# Patient Record
Sex: MALE | Race: WHITE | NOT HISPANIC OR LATINO | Employment: FULL TIME | URBAN - METROPOLITAN AREA
[De-identification: names, ages, dates, MRNs, and addresses within clinical notes are randomized per-mention and may not be internally consistent; named-entity substitution may affect disease eponyms.]

---

## 2018-05-22 ENCOUNTER — OFFICE VISIT (OUTPATIENT)
Dept: GASTROENTEROLOGY | Facility: CLINIC | Age: 18
End: 2018-05-22

## 2018-05-22 VITALS
BODY MASS INDEX: 25.45 KG/M2 | WEIGHT: 181.8 LBS | SYSTOLIC BLOOD PRESSURE: 126 MMHG | TEMPERATURE: 99.1 F | DIASTOLIC BLOOD PRESSURE: 82 MMHG | HEIGHT: 71 IN

## 2018-05-22 DIAGNOSIS — K62.5 RECTAL BLEEDING: Primary | ICD-10-CM

## 2018-05-22 PROCEDURE — 99203 OFFICE O/P NEW LOW 30 MIN: CPT | Performed by: INTERNAL MEDICINE

## 2018-05-22 RX ORDER — SUMATRIPTAN 50 MG/1
50 TABLET, FILM COATED ORAL
COMMUNITY
Start: 2018-03-19 | End: 2019-03-19

## 2018-05-22 RX ORDER — SODIUM CHLORIDE, SODIUM LACTATE, POTASSIUM CHLORIDE, CALCIUM CHLORIDE 600; 310; 30; 20 MG/100ML; MG/100ML; MG/100ML; MG/100ML
30 INJECTION, SOLUTION INTRAVENOUS CONTINUOUS
Status: CANCELLED | OUTPATIENT
Start: 2018-05-29

## 2018-05-22 RX ORDER — TOPIRAMATE 100 MG/1
100 CAPSULE, EXTENDED RELEASE ORAL DAILY
COMMUNITY
Start: 2018-05-22

## 2018-05-22 NOTE — PROGRESS NOTES
Chief Complaint   Patient presents with   • Rectal Bleeding     Subjective      HPI     Milton Islas is a 18 y.o. male who presents for evaluation of rectal bleeding.  Episodes occurring off/on since 2016.  Predominately fresh blood, mostly on tissue with wiping.  No associated abdominal pain.  No change in bowel habit.  No fevers/chills.  No family hx of GI related disorders.  Last episode was 2 weeks ago.        Past Medical History:   Diagnosis Date   • ADHD (attention deficit hyperactivity disorder)    • Hearing loss        Current Outpatient Prescriptions:   •  Methylphenidate 17.3 MG Tablet Extended Release Dispersible, Take  by mouth., Disp: , Rfl:   •  SUMAtriptan (IMITREX) 50 MG tablet, Take 50 mg by mouth., Disp: , Rfl:   •  Topiramate ER (TROKENDI XR) 100 MG capsule sustained-release 24 hr, Take 100 mg by mouth., Disp: , Rfl:      Allergies   Allergen Reactions   • Penicillins Rash     Social History     Social History   • Marital status: Single     Spouse name: N/A   • Number of children: N/A   • Years of education: N/A     Occupational History   • Not on file.     Social History Main Topics   • Smoking status: Never Smoker   • Smokeless tobacco: Not on file   • Alcohol use No   • Drug use: Unknown   • Sexual activity: Not on file     Other Topics Concern   • Not on file     Social History Narrative   • No narrative on file     History reviewed. No pertinent family history.     Review of Systems   Constitutional: Negative.    Respiratory: Negative.    Cardiovascular: Negative.    Gastrointestinal: Positive for blood in stool.   Psychiatric/Behavioral: Negative.           Objective   Vitals:    05/22/18 1456   BP: 126/82   Temp: 99.1 °F (37.3 °C)     Physical Exam   Constitutional: He is oriented to person, place, and time. He appears well-developed and well-nourished.   HENT:   Head: Normocephalic and atraumatic.   Abdominal: Soft. Bowel sounds are normal. He exhibits no distension and no mass. There is  no tenderness. No hernia.   Genitourinary: Rectal exam shows no external hemorrhoid, no internal hemorrhoid, no fissure and no tenderness.   Neurological: He is alert and oriented to person, place, and time.   Skin: Skin is warm and dry.   Psychiatric: He has a normal mood and affect. His behavior is normal. Judgment and thought content normal.   Vitals reviewed.       Assessment/Plan   Assessment:     1. Rectal bleeding      Plan:   Suspect internal hemorrhoids as most likely culprit, but recommend we proceed with colonoscopy to ensure no IBD or other underlying pathology.  Risks/beneifts discussed with pt and he is agreeable to proceeding        Caesar Marion M.D.  [facility]  [address] [place] [address]  Office: [phone]

## 2018-05-29 ENCOUNTER — ANESTHESIA (OUTPATIENT)
Dept: GASTROENTEROLOGY | Facility: HOSPITAL | Age: 18
End: 2018-05-29

## 2018-05-29 ENCOUNTER — HOSPITAL ENCOUNTER (OUTPATIENT)
Facility: HOSPITAL | Age: 18
Setting detail: HOSPITAL OUTPATIENT SURGERY
Discharge: HOME | End: 2018-05-29
Attending: INTERNAL MEDICINE | Admitting: INTERNAL MEDICINE

## 2018-05-29 ENCOUNTER — ANESTHESIA EVENT (OUTPATIENT)
Dept: GASTROENTEROLOGY | Facility: HOSPITAL | Age: 18
End: 2018-05-29

## 2018-05-29 VITALS
RESPIRATION RATE: 18 BRPM | OXYGEN SATURATION: 100 % | BODY MASS INDEX: 25.02 KG/M2 | DIASTOLIC BLOOD PRESSURE: 75 MMHG | WEIGHT: 178.7 LBS | HEART RATE: 65 BPM | HEIGHT: 71 IN | SYSTOLIC BLOOD PRESSURE: 101 MMHG | TEMPERATURE: 99.1 F

## 2018-05-29 DIAGNOSIS — K62.5 RECTAL BLEEDING: ICD-10-CM

## 2018-05-29 PROCEDURE — 45378 DIAGNOSTIC COLONOSCOPY: CPT | Performed by: INTERNAL MEDICINE

## 2018-05-29 PROCEDURE — 25010000002 PROPOFOL 10 MG/ML EMULSION: Performed by: ANESTHESIOLOGY

## 2018-05-29 RX ORDER — PROPOFOL 10 MG/ML
VIAL (ML) INTRAVENOUS AS NEEDED
Status: DISCONTINUED | OUTPATIENT
Start: 2018-05-29 | End: 2018-05-29 | Stop reason: SURG

## 2018-05-29 RX ORDER — SODIUM CHLORIDE, SODIUM LACTATE, POTASSIUM CHLORIDE, CALCIUM CHLORIDE 600; 310; 30; 20 MG/100ML; MG/100ML; MG/100ML; MG/100ML
30 INJECTION, SOLUTION INTRAVENOUS CONTINUOUS
Status: DISCONTINUED | OUTPATIENT
Start: 2018-05-29 | End: 2018-05-29 | Stop reason: HOSPADM

## 2018-05-29 RX ORDER — LIDOCAINE HYDROCHLORIDE 20 MG/ML
INJECTION, SOLUTION INFILTRATION; PERINEURAL AS NEEDED
Status: DISCONTINUED | OUTPATIENT
Start: 2018-05-29 | End: 2018-05-29 | Stop reason: SURG

## 2018-05-29 RX ORDER — PROPOFOL 10 MG/ML
VIAL (ML) INTRAVENOUS CONTINUOUS PRN
Status: DISCONTINUED | OUTPATIENT
Start: 2018-05-29 | End: 2018-05-29 | Stop reason: SURG

## 2018-05-29 RX ADMIN — SODIUM CHLORIDE, POTASSIUM CHLORIDE, SODIUM LACTATE AND CALCIUM CHLORIDE 30 ML/HR: 600; 310; 30; 20 INJECTION, SOLUTION INTRAVENOUS at 09:09

## 2018-05-29 RX ADMIN — PROPOFOL 50 MG: 10 INJECTION, EMULSION INTRAVENOUS at 09:57

## 2018-05-29 RX ADMIN — LIDOCAINE HYDROCHLORIDE 60 MG: 20 INJECTION, SOLUTION INFILTRATION; PERINEURAL at 09:53

## 2018-05-29 RX ADMIN — PROPOFOL 160 MCG/KG/MIN: 10 INJECTION, EMULSION INTRAVENOUS at 09:53

## 2018-05-29 RX ADMIN — PROPOFOL 150 MG: 10 INJECTION, EMULSION INTRAVENOUS at 09:53

## 2018-05-29 NOTE — ANESTHESIA POSTPROCEDURE EVALUATION
"Patient: Milton Islas    Procedure Summary     Date:  05/29/18 Room / Location:   [facility] /  [facility]    Anesthesia Start:  0950 Anesthesia Stop:  1011    Procedure:  COLONOSCOPY TO CECUM AND TI (N/A ) Diagnosis:       Rectal bleeding      (Rectal bleeding [K62.5])    Surgeon:  Caesar Marion MD Provider:  Caesar Bradshaw MD    Anesthesia Type:  MAC ASA Status:  2          Anesthesia Type: MAC  Last vitals  BP   101/75 (05/29/18 1041)   Temp   37.3 °C (99.1 °F) (05/29/18 0904)   Pulse   65 (05/29/18 1041)   Resp   18 (05/29/18 1041)     SpO2   100 % (05/29/18 1041)     Post Anesthesia Care and Evaluation    Patient location during evaluation: bedside  Patient participation: complete - patient participated  Level of consciousness: awake and alert  Pain management: adequate  Airway patency: patent  Anesthetic complications: No anesthetic complications  PONV Status: none  Cardiovascular status: acceptable  Respiratory status: acceptable  Hydration status: acceptable    Comments: /75 (Patient Position: Sitting)   Pulse 65   Temp 37.3 °C (99.1 °F) (Oral)   Resp 18   Ht 180.3 cm (71\")   Wt 81.1 kg (178 lb 11.2 oz)   SpO2 100%   BMI 24.92 kg/m²         "

## 2018-05-29 NOTE — ANESTHESIA PREPROCEDURE EVALUATION
Anesthesia Evaluation     Patient summary reviewed   no history of anesthetic complications:  NPO Solid Status: > 8 hours  NPO Liquid Status: > 2 hours           Airway   Mallampati: I  TM distance: >3 FB  Neck ROM: full  no difficulty expected  Dental      Pulmonary     breath sounds clear to auscultation  (-) shortness of breath, not a smoker  Cardiovascular   Exercise tolerance: good (4-7 METS)    Rhythm: regular  Rate: normal    (-) angina, FLORES      Neuro/Psych  GI/Hepatic/Renal/Endo    (+)  GI bleeding,     Musculoskeletal     Abdominal    Substance History      OB/GYN          Other                        Anesthesia Plan    ASA 2     MAC     Anesthetic plan and risks discussed with patient.

## 2018-11-12 ENCOUNTER — TELEPHONE (OUTPATIENT)
Dept: GASTROENTEROLOGY | Facility: CLINIC | Age: 18
End: 2018-11-12

## 2018-11-12 NOTE — TELEPHONE ENCOUNTER
"Per a staff message from [place]: \"PT'S MOM PHILIP CALLED AGAIN - RECTAL BLEEDING. PROBLEM STARTED YESTERDAY. DR BECKMAN ADVISED THEM TO CALL IF HAPPENED AGAIN.\"    Called pt's mom back (ok per SHALOM). She states pt had a scope back in May and everything was normal. Over the weekend, pt was on his way back in to town and he kept telling the person who was driving that he had to go to the bathroom. They tried stopping a few times, but everywhere was closed. When they finally stopped, he had a large soft BM with bright red blood mixed in with stool. She states the blood was in a line \"like a worm\". Stool was normal in color: brown and there was about a teaspoonful of blood in the commode. Pt not having any other symptoms: no abdominal pain, nausea, vomiting fever or chills. Asked if pt has continued to have rectal bleeding since then and she stated she did not think so because pt has not mentioned another episode. Advised will update MD ad call back if he has any recs for pt. Pt's mom verb understanding.   "

## 2018-11-12 NOTE — TELEPHONE ENCOUNTER
----- Message from Nathalia Fisher sent at 11/12/2018 11:18 AM EST -----  Regarding: blood in stool   Contact: [phone]  Mother called stated having some bright red blood in stool

## 2018-11-13 NOTE — TELEPHONE ENCOUNTER
Called [phone] and pt's father answered the phone. He advised I can reach pt at his cell number, [phone]. Since he was not listed on SHALOM, called pt at cell number.     Called pt and left a message for call back.

## 2018-11-13 NOTE — TELEPHONE ENCOUNTER
Pt returned call. Advised of the note from Dr Marion. He verb understanding. He has not had any further episodes since the one after driving home. He will continue to mitior and call if he has any further issues.    normal (ped)...

## 2019-02-26 ENCOUNTER — TELEPHONE (OUTPATIENT)
Dept: GASTROENTEROLOGY | Facility: CLINIC | Age: 19
End: 2019-02-26

## 2019-02-26 NOTE — TELEPHONE ENCOUNTER
----- Message from Yanni Olmstead sent at 2/26/2019  1:42 PM EST -----  Regarding: RECTAL BLEEDING  Contact: [phone]  BRIGHT RED BLOOD. PT MOM PHILIP CALLED. PT WAS ADVISED TO CALL BACK WHENEVER HE HAD ISSUES.

## 2019-02-26 NOTE — TELEPHONE ENCOUNTER
Returned mother's phone call(adamaris is listed on SHALOM). She states the patient called her and told her he has passed bright red blood for the past two Sundays and requested she call the office to set up an appointment. When asked for more details, mother was unable. Advised will need to talk to the patient. She states the best way to contact the patient is by his cell phone number. [phone].

## 2019-02-27 NOTE — TELEPHONE ENCOUNTER
Pt returned call per a staff message.    Called pt back.... No answer; left a message for call back.

## 2019-02-28 NOTE — TELEPHONE ENCOUNTER
Patient called into the office, he states 2 weeks ago(week of 2/10) he had a BM on Usama 2/10 and then again on Saturday 2/16.  He states the blood was more like clots and were anywhere between bright red and burgundy in color. Denies any abdominal cramping and state no change in diet. Scheduled patient with Chloe VÁSQUEZ on 3/6. Advised Dr. Marion will be in the office should any questions should arise that the NP cannot answer. Advised will send an update to Dr. Marion. He verb understanding and is in agreement with the plan.

## 2019-03-06 ENCOUNTER — OFFICE VISIT (OUTPATIENT)
Dept: GASTROENTEROLOGY | Facility: CLINIC | Age: 19
End: 2019-03-06

## 2019-03-06 VITALS
DIASTOLIC BLOOD PRESSURE: 80 MMHG | TEMPERATURE: 98.8 F | WEIGHT: 172.8 LBS | BODY MASS INDEX: 24.19 KG/M2 | SYSTOLIC BLOOD PRESSURE: 120 MMHG | HEIGHT: 71 IN

## 2019-03-06 DIAGNOSIS — K62.5 RECTAL BLEEDING: ICD-10-CM

## 2019-03-06 DIAGNOSIS — R10.32 LEFT LOWER QUADRANT PAIN: Primary | ICD-10-CM

## 2019-03-06 LAB
ALBUMIN SERPL-MCNC: 5.1 G/DL (ref 3.5–5.2)
ALBUMIN/GLOB SERPL: 2.7 G/DL
ALP SERPL-CCNC: 58 U/L (ref 39–117)
ALT SERPL-CCNC: 14 U/L (ref 1–41)
AST SERPL-CCNC: 14 U/L (ref 1–40)
BASOPHILS # BLD AUTO: 0.03 10*3/MM3 (ref 0–0.2)
BASOPHILS NFR BLD AUTO: 0.7 % (ref 0–1.5)
BILIRUB SERPL-MCNC: 0.4 MG/DL (ref 0.1–1.2)
BUN SERPL-MCNC: 11 MG/DL (ref 6–20)
BUN/CREAT SERPL: 8.7 (ref 7–25)
CALCIUM SERPL-MCNC: 10.6 MG/DL (ref 8.6–10.5)
CHLORIDE SERPL-SCNC: 108 MMOL/L (ref 98–107)
CO2 SERPL-SCNC: 22.2 MMOL/L (ref 22–29)
CREAT SERPL-MCNC: 1.27 MG/DL (ref 0.76–1.27)
EOSINOPHIL # BLD AUTO: 0.07 10*3/MM3 (ref 0–0.4)
EOSINOPHIL NFR BLD AUTO: 1.6 % (ref 0.3–6.2)
ERYTHROCYTE [DISTWIDTH] IN BLOOD BY AUTOMATED COUNT: 12.9 % (ref 12.3–15.4)
GLOBULIN SER CALC-MCNC: 1.9 GM/DL
GLUCOSE SERPL-MCNC: 89 MG/DL (ref 65–99)
HCT VFR BLD AUTO: 48.9 % (ref 37.5–51)
HGB BLD-MCNC: 15.6 G/DL (ref 13–17.7)
IMM GRANULOCYTES # BLD AUTO: 0.01 10*3/MM3 (ref 0–0.05)
IMM GRANULOCYTES NFR BLD AUTO: 0.2 % (ref 0–0.5)
LYMPHOCYTES # BLD AUTO: 1.37 10*3/MM3 (ref 0.7–3.1)
LYMPHOCYTES NFR BLD AUTO: 30.8 % (ref 19.6–45.3)
MCH RBC QN AUTO: 28.5 PG (ref 26.6–33)
MCHC RBC AUTO-ENTMCNC: 31.9 G/DL (ref 31.5–35.7)
MCV RBC AUTO: 89.2 FL (ref 79–97)
MONOCYTES # BLD AUTO: 0.4 10*3/MM3 (ref 0.1–0.9)
MONOCYTES NFR BLD AUTO: 9 % (ref 5–12)
NEUTROPHILS # BLD AUTO: 2.57 10*3/MM3 (ref 1.4–7)
NEUTROPHILS NFR BLD AUTO: 57.7 % (ref 42.7–76)
NRBC BLD AUTO-RTO: 0 /100 WBC (ref 0–0)
PLATELET # BLD AUTO: 225 10*3/MM3 (ref 140–450)
POTASSIUM SERPL-SCNC: 4.1 MMOL/L (ref 3.5–5.2)
PROT SERPL-MCNC: 7 G/DL (ref 6–8.5)
RBC # BLD AUTO: 5.48 10*6/MM3 (ref 4.14–5.8)
SODIUM SERPL-SCNC: 144 MMOL/L (ref 136–145)
WBC # BLD AUTO: 4.45 10*3/MM3 (ref 3.4–10.8)

## 2019-03-06 PROCEDURE — 99214 OFFICE O/P EST MOD 30 MIN: CPT | Performed by: NURSE PRACTITIONER

## 2019-03-06 RX ORDER — DEXMETHYLPHENIDATE HYDROCHLORIDE 15 MG/1
CAPSULE, EXTENDED RELEASE ORAL DAILY
COMMUNITY

## 2019-03-06 RX ORDER — HYDROCORTISONE ACETATE 25 MG/1
25 SUPPOSITORY RECTAL NIGHTLY
Qty: 14 SUPPOSITORY | Refills: 0 | Status: SHIPPED | OUTPATIENT
Start: 2019-03-06 | End: 2019-03-20

## 2019-03-06 RX ORDER — SULFAMETHOXAZOLE AND TRIMETHOPRIM 800; 160 MG/1; MG/1
TABLET ORAL
Refills: 7 | COMMUNITY
Start: 2019-02-11 | End: 2023-02-20 | Stop reason: ALTCHOICE

## 2019-03-06 NOTE — PROGRESS NOTES
Chief Complaint   Patient presents with   • Abdominal Pain   • Rectal Bleeding       Milton Islas is a  19 y.o. male here for a follow up visit for rectal bleeding and abdominal pain..    HPI    This is an established patient new to me seen today in follow-up for rectal bleeding and abdominal pain..  He follows with Dr. Marion.  He underwent colonoscopy for the same symptoms in May 2018 with the following findings;    Colonoscopy impression:    Nodular ileal mucosa.  The entire examined colon is normal on direct and retroflexion views. No obvious hemorrhoids. No clear source for the patient's intermittent rectal bleeding identified. No specimens collected.    He describes episodes of rectal bleeding since November.  When bleeding occurs is bright red and present on the toilet paper and in the toilet bowl usually at the end of his bowel movement.  He has several pictures with him today which I did review.  The blood is bright red.  There is associated left-sided mid abdominal pain.  Described as an ache.  Does not correlate with rectal bleeding.  The pain comes and goes.  Is not necessarily occur daily.  Bowels are moving daily.  He denies constipation or diarrhea.  He denies fever or chills.  He is under a great deal of stress as he is a college student.    Appetite is good.  He has rare heartburn about once a month with certain dietary triggers.  Denies nausea, vomiting, or dysphagia.  His weight is stable.      Past Medical History:   Diagnosis Date   • ADHD (attention deficit hyperactivity disorder)    • Hearing loss        Past Surgical History:   Procedure Laterality Date   • COLONOSCOPY N/A 5/29/2018    nodular ileal mucosa, no specimens collected   • TONGUE SURGERY     • TONSILLECTOMY     • WISDOM TOOTH EXTRACTION         Scheduled Meds:  Outpatient Encounter Medications as of 3/6/2019   Medication Sig Dispense Refill   • dexmethylphenidate XR (FOCALIN XR) 25 MG 24 hr capsule Take 25 mg by mouth Daily      • sulfamethoxazole-trimethoprim (BACTRIM DS,SEPTRA DS) 800-160 MG per tablet   7   • SUMAtriptan (IMITREX) 50 MG tablet Take 50 mg by mouth.     • Topiramate ER (TROKENDI XR) 100 MG capsule sustained-release 24 hr Take 100 mg by mouth.     • hydrocortisone (ANUSOL-HC) 25 MG suppository Insert 1 suppository into the rectum Every Night for 14 days. 14 suppository 0   • Methylphenidate 17.3 MG Tablet Extended Release Dispersible Take  by mouth.       No facility-administered encounter medications on file as of 3/6/2019.        Continuous Infusions:  No current facility-administered medications for this visit.     PRN Meds:.    Allergies   Allergen Reactions   • Penicillins Rash       Social History     Socioeconomic History   • Marital status: Single     Spouse name: Not on file   • Number of children: Not on file   • Years of education: Not on file   • Highest education level: Not on file   Social Needs   • Financial resource strain: Not on file   • Food insecurity - worry: Not on file   • Food insecurity - inability: Not on file   • Transportation needs - medical: Not on file   • Transportation needs - non-medical: Not on file   Occupational History   • Not on file   Tobacco Use   • Smoking status: Never Smoker   • Smokeless tobacco: Never Used   Substance and Sexual Activity   • Alcohol use: Yes     Comment: occ   • Drug use: Not on file   • Sexual activity: Not on file   Other Topics Concern   • Not on file   Social History Narrative   • Not on file       Family History   Problem Relation Age of Onset   • Irritable bowel syndrome Cousin        Review of Systems   Constitutional: Negative for activity change, appetite change, fatigue, fever and unexpected weight change.   HENT: Negative for trouble swallowing.    Respiratory: Negative for apnea, cough, choking, chest tightness, shortness of breath and wheezing.    Cardiovascular: Negative for chest pain, palpitations and leg swelling.   Gastrointestinal: Positive  for abdominal pain and anal bleeding. Negative for abdominal distention, blood in stool, constipation, diarrhea, nausea, rectal pain and vomiting.       Vitals:    03/06/19 1046   BP: 120/80   Temp: 98.8 °F (37.1 °C)       Physical Exam   Constitutional: He is oriented to person, place, and time. He appears well-developed and well-nourished.   Eyes: Pupils are equal, round, and reactive to light.   Neck: Normal range of motion.   Cardiovascular: Normal rate, regular rhythm and normal heart sounds.   Pulmonary/Chest: Effort normal and breath sounds normal. No respiratory distress. He has no wheezes.   Abdominal: Soft. Bowel sounds are normal. He exhibits no distension and no mass. There is no tenderness. There is no guarding. No hernia.   Musculoskeletal: Normal range of motion.   Neurological: He is alert and oriented to person, place, and time.   Skin: Skin is warm and dry. Capillary refill takes less than 2 seconds.   Psychiatric: He has a normal mood and affect. His behavior is normal.       No images are attached to the encounter.    Milton was seen today for abdominal pain and rectal bleeding.    Diagnoses and all orders for this visit:    Left lower quadrant pain  -     CT Abdomen Pelvis With Contrast; Future  -     CBC & Differential  -     Comprehensive Metabolic Panel    Rectal bleeding  -     CBC & Differential  -     Comprehensive Metabolic Panel    Other orders  -     hydrocortisone (ANUSOL-HC) 25 MG suppository; Insert 1 suppository into the rectum Every Night for 14 days.    Assessment    #1 rectal bleeding, no evidence of inflammatory bowel disease on recent colonoscopy.  #2 left-sided mid abdominal pain, no correlation with rectal bleeding.    Plan    CBC and CMP today  CT of abdomen and pelvis further assess patient's abdominal pain.  Hydrocortisone suppositories nightly for the next 2 weeks.  Follow-up 6 weeks.

## 2019-03-22 ENCOUNTER — HOSPITAL ENCOUNTER (OUTPATIENT)
Dept: CT IMAGING | Facility: HOSPITAL | Age: 19
Discharge: HOME | End: 2019-03-22
Admitting: NURSE PRACTITIONER

## 2019-03-22 DIAGNOSIS — R10.32 LEFT LOWER QUADRANT PAIN: ICD-10-CM

## 2019-03-22 LAB — CREAT BLDA-MCNC: 1.1 MG/DL (ref 0.6–1.3)

## 2019-03-22 PROCEDURE — 74177 CT ABD & PELVIS W/CONTRAST: CPT

## 2019-03-22 PROCEDURE — 82565 ASSAY OF CREATININE: CPT

## 2019-03-22 PROCEDURE — 25010000002 IOPAMIDOL 61 % SOLUTION: Performed by: NURSE PRACTITIONER

## 2019-03-22 RX ADMIN — IOPAMIDOL 95 ML: 612 INJECTION, SOLUTION INTRAVENOUS at 11:54

## 2019-03-26 ENCOUNTER — TELEPHONE (OUTPATIENT)
Dept: GASTROENTEROLOGY | Facility: CLINIC | Age: 19
End: 2019-03-26

## 2019-03-26 NOTE — TELEPHONE ENCOUNTER
----- Message from BROOKE Singh sent at 3/25/2019  8:47 AM EDT -----  Please notify patient that CT of abdomen and pelvis is normal.

## 2019-03-26 NOTE — TELEPHONE ENCOUNTER
----- Message from BROOKE Sinhg sent at 3/7/2019  1:16 PM EST -----  Please notify patient that blood work shows normal blood counts.  Normal kidney function and normal liver enzymes.

## 2019-04-12 ENCOUNTER — OFFICE VISIT (OUTPATIENT)
Dept: GASTROENTEROLOGY | Facility: CLINIC | Age: 19
End: 2019-04-12

## 2019-04-12 VITALS
BODY MASS INDEX: 24.61 KG/M2 | SYSTOLIC BLOOD PRESSURE: 118 MMHG | WEIGHT: 175.8 LBS | TEMPERATURE: 98.3 F | HEIGHT: 71 IN | DIASTOLIC BLOOD PRESSURE: 64 MMHG

## 2019-04-12 DIAGNOSIS — R10.32 LEFT LOWER QUADRANT PAIN: ICD-10-CM

## 2019-04-12 DIAGNOSIS — K62.5 RECTAL BLEEDING: Primary | ICD-10-CM

## 2019-04-12 PROCEDURE — 99212 OFFICE O/P EST SF 10 MIN: CPT | Performed by: INTERNAL MEDICINE

## 2019-04-12 RX ORDER — SUMATRIPTAN 50 MG/1
50 TABLET, FILM COATED ORAL AS NEEDED
COMMUNITY
Start: 2018-03-19

## 2019-04-15 NOTE — PROGRESS NOTES
Chief Complaint   Patient presents with   • Follow-up   • Abdominal Pain     Subjective     HPI     Milton Islas is a 19 y.o. male who presents today for follow up. Pt has been recently evaluated for LLQ pain and intermittent rectal bleeding. He underwent colonoscopy last May for this symptoms.  This was normal. Ct scan performed 3/22 was reviewed today with pt and mom, and was unremarkable.  His routine lab work was also WNL.      Pt reports no current Gi complaints. He has not had any episodes of rectal bleeding in nearly 4 weeks.  He denies abdominal pain.      Past Medical History:   Diagnosis Date   • ADHD (attention deficit hyperactivity disorder)    • Hearing loss        Social History     Socioeconomic History   • Marital status: Single     Spouse name: Not on file   • Number of children: Not on file   • Years of education: Not on file   • Highest education level: Not on file   Tobacco Use   • Smoking status: Never Smoker   • Smokeless tobacco: Never Used   Substance and Sexual Activity   • Alcohol use: Yes     Comment: occ         Current Outpatient Medications:   •  dexmethylphenidate XR (FOCALIN XR) 25 MG 24 hr capsule, Take 25 mg by mouth Daily, Disp: , Rfl:   •  sulfamethoxazole-trimethoprim (BACTRIM DS,SEPTRA DS) 800-160 MG per tablet, , Disp: , Rfl: 7  •  SUMAtriptan (IMITREX) 50 MG tablet, Take 50 mg by mouth As Needed., Disp: , Rfl:   •  Topiramate ER (TROKENDI XR) 100 MG capsule sustained-release 24 hr, Take 150 mg by mouth Daily., Disp: , Rfl:   •  Methylphenidate 17.3 MG Tablet Extended Release Dispersible, Take  by mouth., Disp: , Rfl:     Review of Systems   Constitutional: Negative.    Gastrointestinal: Negative.        Objective   Vitals:    04/12/19 0913   BP: 118/64   Temp: 98.3 °F (36.8 °C)       Physical Exam   Constitutional: He is oriented to person, place, and time. He appears well-developed and well-nourished.   HENT:   Head: Normocephalic and atraumatic.   Abdominal: Soft. Bowel  sounds are normal. He exhibits no distension and no mass. There is no tenderness. No hernia.   Neurological: He is alert and oriented to person, place, and time.   Skin: Skin is warm and dry.   Psychiatric: He has a normal mood and affect. His behavior is normal. Judgment and thought content normal.   Vitals reviewed.      Assessment/Plan   Assessment:     1. Rectal bleeding    2. Left lower quadrant pain      Plan:   Pt with negative colonscopy and CT scan in last year for workup of his intermittent rectal bleeding.  Labs all WNL.  ? If he may have intermittent anal fissure, although none identified on prior SETH.  Regardless, I have offered reassurance to patient and mom today regarding negative workup to date.  He may consider a daily fiber supplement and stool softener, particularly if he has any issues with straining or firm stool.  Otherwise, I would not pursue further workup at this time.     F/U SULAIMAN Marion M.D.  [facility]  [address] [place] [address]  Office: [phone]

## 2021-04-16 ENCOUNTER — BULK ORDERING (OUTPATIENT)
Dept: CASE MANAGEMENT | Facility: OTHER | Age: 21
End: 2021-04-16

## 2021-04-16 DIAGNOSIS — Z23 IMMUNIZATION DUE: ICD-10-CM

## 2022-10-19 ENCOUNTER — TRANSCRIBE ORDERS (OUTPATIENT)
Dept: ADMINISTRATIVE | Facility: HOSPITAL | Age: 22
End: 2022-10-19

## 2022-10-19 DIAGNOSIS — R00.0 TACHYCARDIA: Primary | ICD-10-CM

## 2022-11-01 ENCOUNTER — APPOINTMENT (OUTPATIENT)
Dept: CARDIOLOGY | Facility: HOSPITAL | Age: 22
End: 2022-11-01

## 2023-02-10 ENCOUNTER — APPOINTMENT (OUTPATIENT)
Dept: CARDIOLOGY | Facility: HOSPITAL | Age: 23
End: 2023-02-10
Payer: COMMERCIAL

## 2023-02-10 ENCOUNTER — HOSPITAL ENCOUNTER (EMERGENCY)
Facility: HOSPITAL | Age: 23
Discharge: HOME | End: 2023-02-10
Attending: EMERGENCY MEDICINE | Admitting: EMERGENCY MEDICINE
Payer: COMMERCIAL

## 2023-02-10 ENCOUNTER — APPOINTMENT (OUTPATIENT)
Dept: GENERAL RADIOLOGY | Facility: HOSPITAL | Age: 23
End: 2023-02-10
Payer: COMMERCIAL

## 2023-02-10 VITALS
HEIGHT: 71 IN | TEMPERATURE: 99.6 F | DIASTOLIC BLOOD PRESSURE: 85 MMHG | WEIGHT: 212 LBS | OXYGEN SATURATION: 99 % | RESPIRATION RATE: 18 BRPM | BODY MASS INDEX: 29.68 KG/M2 | HEART RATE: 90 BPM | SYSTOLIC BLOOD PRESSURE: 124 MMHG

## 2023-02-10 DIAGNOSIS — R00.2 PALPITATIONS: Primary | ICD-10-CM

## 2023-02-10 DIAGNOSIS — R00.0 TACHYCARDIA: ICD-10-CM

## 2023-02-10 LAB
ALBUMIN SERPL-MCNC: 4.5 G/DL (ref 3.5–5.2)
ALBUMIN/GLOB SERPL: 1.7 G/DL
ALP SERPL-CCNC: 78 U/L (ref 39–117)
ALT SERPL W P-5'-P-CCNC: 21 U/L (ref 1–41)
ANION GAP SERPL CALCULATED.3IONS-SCNC: 11.4 MMOL/L (ref 5–15)
AST SERPL-CCNC: 17 U/L (ref 1–40)
BASOPHILS # BLD AUTO: 0.03 10*3/MM3 (ref 0–0.2)
BASOPHILS NFR BLD AUTO: 0.4 % (ref 0–1.5)
BILIRUB SERPL-MCNC: 0.4 MG/DL (ref 0–1.2)
BILIRUB UR QL STRIP: NEGATIVE
BUN SERPL-MCNC: 11 MG/DL (ref 6–20)
BUN/CREAT SERPL: 10.1 (ref 7–25)
CALCIUM SPEC-SCNC: 9.7 MG/DL (ref 8.6–10.5)
CHLORIDE SERPL-SCNC: 105 MMOL/L (ref 98–107)
CLARITY UR: CLEAR
CO2 SERPL-SCNC: 26.6 MMOL/L (ref 22–29)
COLOR UR: YELLOW
CREAT SERPL-MCNC: 1.09 MG/DL (ref 0.76–1.27)
D DIMER PPP FEU-MCNC: <0.27 MCGFEU/ML (ref 0–0.5)
DEPRECATED RDW RBC AUTO: 37.4 FL (ref 37–54)
EGFRCR SERPLBLD CKD-EPI 2021: 96.6 ML/MIN/1.73
EOSINOPHIL # BLD AUTO: 0.07 10*3/MM3 (ref 0–0.4)
EOSINOPHIL NFR BLD AUTO: 0.9 % (ref 0.3–6.2)
ERYTHROCYTE [DISTWIDTH] IN BLOOD BY AUTOMATED COUNT: 12.7 % (ref 12.3–15.4)
GLOBULIN UR ELPH-MCNC: 2.6 GM/DL
GLUCOSE SERPL-MCNC: 100 MG/DL (ref 65–99)
GLUCOSE UR STRIP-MCNC: NEGATIVE MG/DL
HCT VFR BLD AUTO: 44.2 % (ref 37.5–51)
HGB BLD-MCNC: 14.7 G/DL (ref 13–17.7)
HGB UR QL STRIP.AUTO: NEGATIVE
IMM GRANULOCYTES # BLD AUTO: 0.04 10*3/MM3 (ref 0–0.05)
IMM GRANULOCYTES NFR BLD AUTO: 0.5 % (ref 0–0.5)
KETONES UR QL STRIP: NEGATIVE
LEUKOCYTE ESTERASE UR QL STRIP.AUTO: NEGATIVE
LYMPHOCYTES # BLD AUTO: 1.07 10*3/MM3 (ref 0.7–3.1)
LYMPHOCYTES NFR BLD AUTO: 14.5 % (ref 19.6–45.3)
MCH RBC QN AUTO: 27.8 PG (ref 26.6–33)
MCHC RBC AUTO-ENTMCNC: 33.3 G/DL (ref 31.5–35.7)
MCV RBC AUTO: 83.6 FL (ref 79–97)
MONOCYTES # BLD AUTO: 0.75 10*3/MM3 (ref 0.1–0.9)
MONOCYTES NFR BLD AUTO: 10.2 % (ref 5–12)
NEUTROPHILS NFR BLD AUTO: 5.41 10*3/MM3 (ref 1.7–7)
NEUTROPHILS NFR BLD AUTO: 73.5 % (ref 42.7–76)
NITRITE UR QL STRIP: NEGATIVE
NRBC BLD AUTO-RTO: 0 /100 WBC (ref 0–0.2)
PH UR STRIP.AUTO: 8 [PH] (ref 5–8)
PLATELET # BLD AUTO: 197 10*3/MM3 (ref 140–450)
PMV BLD AUTO: 9.4 FL (ref 6–12)
POTASSIUM SERPL-SCNC: 4 MMOL/L (ref 3.5–5.2)
PROT SERPL-MCNC: 7.1 G/DL (ref 6–8.5)
PROT UR QL STRIP: NEGATIVE
QT INTERVAL: 290 MS
RBC # BLD AUTO: 5.29 10*6/MM3 (ref 4.14–5.8)
SODIUM SERPL-SCNC: 143 MMOL/L (ref 136–145)
SP GR UR STRIP: 1.01 (ref 1–1.03)
T4 FREE SERPL-MCNC: 1.31 NG/DL (ref 0.93–1.7)
TROPONIN T SERPL HS-MCNC: <6 NG/L
TSH SERPL DL<=0.05 MIU/L-ACNC: 0.83 UIU/ML (ref 0.27–4.2)
UROBILINOGEN UR QL STRIP: NORMAL
WBC NRBC COR # BLD: 7.37 10*3/MM3 (ref 3.4–10.8)

## 2023-02-10 PROCEDURE — 84439 ASSAY OF FREE THYROXINE: CPT | Performed by: NURSE PRACTITIONER

## 2023-02-10 PROCEDURE — 93242 EXT ECG>48HR<7D RECORDING: CPT

## 2023-02-10 PROCEDURE — 71045 X-RAY EXAM CHEST 1 VIEW: CPT

## 2023-02-10 PROCEDURE — 85379 FIBRIN DEGRADATION QUANT: CPT | Performed by: NURSE PRACTITIONER

## 2023-02-10 PROCEDURE — 84484 ASSAY OF TROPONIN QUANT: CPT | Performed by: NURSE PRACTITIONER

## 2023-02-10 PROCEDURE — 93010 ELECTROCARDIOGRAM REPORT: CPT | Performed by: STUDENT IN AN ORGANIZED HEALTH CARE EDUCATION/TRAINING PROGRAM

## 2023-02-10 PROCEDURE — 93005 ELECTROCARDIOGRAM TRACING: CPT

## 2023-02-10 PROCEDURE — 85025 COMPLETE CBC W/AUTO DIFF WBC: CPT | Performed by: NURSE PRACTITIONER

## 2023-02-10 PROCEDURE — 81003 URINALYSIS AUTO W/O SCOPE: CPT | Performed by: NURSE PRACTITIONER

## 2023-02-10 PROCEDURE — 80053 COMPREHEN METABOLIC PANEL: CPT | Performed by: NURSE PRACTITIONER

## 2023-02-10 PROCEDURE — 99284 EMERGENCY DEPT VISIT MOD MDM: CPT

## 2023-02-10 PROCEDURE — 84443 ASSAY THYROID STIM HORMONE: CPT | Performed by: NURSE PRACTITIONER

## 2023-02-10 RX ORDER — ISOTRETINOIN 20 MG/1
20 CAPSULE ORAL DAILY
COMMUNITY

## 2023-02-10 RX ORDER — SODIUM CHLORIDE 0.9 % (FLUSH) 0.9 %
10 SYRINGE (ML) INJECTION AS NEEDED
Status: DISCONTINUED | OUTPATIENT
Start: 2023-02-10 | End: 2023-02-10 | Stop reason: HOSPADM

## 2023-02-10 RX ORDER — DIPHENOXYLATE HYDROCHLORIDE AND ATROPINE SULFATE 2.5; .025 MG/1; MG/1
1 TABLET ORAL DAILY
COMMUNITY

## 2023-02-10 RX ADMIN — SODIUM CHLORIDE 1000 ML: 9 INJECTION, SOLUTION INTRAVENOUS at 09:24

## 2023-02-10 NOTE — ED PROVIDER NOTES
MD ATTESTATION NOTE    The DARRYL and I have discussed this patient's history, physical exam, and treatment plan.  I have reviewed the documentation and personally had a face to face interaction with the patient. I affirm the documentation and agree with the treatment and plan.  The attached note describes my personal findings.      I provided a substantive portion of the care of the patient.  I personally performed the physical exam in its entirety, and below are my findings.  For this patient encounter, the patient wore surgical mask, I wore full protective PPE including N95 and eye protection    Brief HPI: Pleasant 23-year-old male with history of ADHD who presents emergency room for intermittent episodes of palpitations, elevated heart rate and dizziness for the past 6 months.  He states that this has been happening time approximately once a month for the past 6 months.  He has seen his doctor about this who has decreased his ADHD medication.  However he has felt his heart racing with dizziness for the past 2 days and thus presents to the emergency room.  He denies chest pain, shortness of breath, fevers, chills, cough, nausea, vomiting, calf pain or leg swelling.  The patient denies caffeine use or Sudafed use.  He states he does not use energy drinks.  The patient denies illicit drug use.  The patient does have strips of elevated heart rate on his Apple phone which show multiple episodes of a sinus tach in the upper 120s.    General : 23-year-old patient is awake alert and oriented  HEENT: NCAT  CV: Heart is regular at a rate of 105 with no murmurs  Respiratory: CTA bilaterally  Abd: Soft and nontender  Ext: No acute abnormalities  Skin: No rash  Neuro: Cranial nerves II through XII grossly intact as tested.  No acute lateralizing deficits.  Psych: Normal mood and affect      Plan: We will check an EKG and labs and place the patient on the monitor and give him IV fluids    The patient's EKG shows a sinus  tachycardia 104 but otherwise is nonspecific.  The patient's troponin, D-dimer, CBC, CMP, thyroid functions and urinalysis are unremarkable.  His chest x-ray is negative acute.    The patient's heart rate is now in the 90s.  We will place a Holter monitor on the patient.  We offered him a prescription for low-dose beta-blockers but he states he would prefer to wear the Holter monitor and follow-up with cardiology as an outpatient.  He was given careful return the emergency room instructions.     Zack Mustafa MD  02/10/23 1630

## 2023-02-10 NOTE — DISCHARGE INSTRUCTIONS
You have been given an emergency department evaluation.  This evaluation is intended to rule out life-threatening conditions.  You could require further testing as determined by your primary care physician or any referred specialist.  Take your prescribed medications  Turn Zio patch and as instructed  We will call you if there is any abnormality on your thyroid labs  Follow-up with your primary care physician and Cardiology-call today to schedule appointment  Return to the emergency department if you experience chest pain, shortness of breath, abdominal pain, fever greater than 102, intractable vomiting, or any new or worsening symptoms.

## 2023-02-10 NOTE — ED NOTES
Pt aware of need for urine specimen.   Pt hx of dementia, attempted to orientate & explain plan of care. Pt aggressive, mult staff members assisted c cleaning pt & obtained labs. Pt found c solied brief, cleaned & pt straight cath c sterile tech, small amount of clear yellow urine return, sent. New brief applied. 20g to L hand. Labs drawn & sent. Flushed c 10cc ns. nsb infusing well. Pt given warm blankets. Vss. Will ctm.

## 2023-02-10 NOTE — ED PROVIDER NOTES
EMERGENCY DEPARTMENT ENCOUNTER    Room Number:  03/03  Date of encounter:  2/10/2023  PCP: Shira Patel MD  Patient Care Team:  Shira Patel MD as PCP - General (Pediatrics)   Independent Historians: Patient        PPE: Patient was placed in face mask in first look. Patient was wearing facemask when I entered the room and throughout our encounter. I wore full protective equipment throughout this patient encounter including a face mask, and gloves. Hand hygiene was performed before donning protective equipment and after removal when leaving the room.    HPI:  Chief Complaint: Palpitations  A complete HPI/ROS/PMH/PSH/SH/FH are unobtainable due to: None    Chronic or social conditions impacting patient care (social determinants of health): None    Context: Milton Islas is a 23 y.o. male with a history of migraine headaches, ADHD who arrives to the ED via private vehicle.  Patient presents with c/o intermittent episodes of elevated heart rate and palpitations for the past 6 months, this episode started 2 days ago.  Patient states that typically these episodes last for several hours and his heart rate returns to normal, however he has not been able to get it to slow down over the past 2 days, he states it is gotten as high as in the 200s.  Patient states that he has not previously been seen for these episodes.  He states he does occasionally get dizzy and lightheaded when this is occurring.  Patient denies fever, chills, cough, chest pain, shortness of breath, nausea, vomiting, diarrhea, calf pain or swelling.  Patient denies excessive caffeine use, states he does not drink energy drinks.  He denies being a smoker, no illicit drug use, very occasional alcohol use.  Patient states that nothing makes the symptoms better and nothing worsens symptoms.  He denies any recent medication changes.    Review of prior external notes (non-ED): Medical records reviewed in [facility], patient last saw his neurologist 8/11/2022,  he is followed there for intractable chronic migraine.    Review of prior external test results outside of this encounter: Cervical spine x-ray 7/13/2019 showed no acute abnormalities.    PAST MEDICAL HISTORY  Active Ambulatory Problems     Diagnosis Date Noted   • Rectal bleeding 05/22/2018     Resolved Ambulatory Problems     Diagnosis Date Noted   • No Resolved Ambulatory Problems     Past Medical History:   Diagnosis Date   • ADHD (attention deficit hyperactivity disorder)    • Hearing loss    • Migraine        The patient has started, but not completed, their COVID-19 vaccination series.    PAST SURGICAL HISTORY  Past Surgical History:   Procedure Laterality Date   • COLONOSCOPY N/A 5/29/2018    nodular ileal mucosa, no specimens collected   • TONGUE SURGERY     • TONSILLECTOMY     • WISDOM TOOTH EXTRACTION           FAMILY HISTORY  Family History   Problem Relation Age of Onset   • Irritable bowel syndrome Cousin          SOCIAL HISTORY  Social History     Socioeconomic History   • Marital status: Single   Tobacco Use   • Smoking status: Never   • Smokeless tobacco: Never   Vaping Use   • Vaping Use: Never used   Substance and Sexual Activity   • Alcohol use: Yes     Comment: occ   • Drug use: Never   • Sexual activity: Defer         ALLERGIES  Penicillins        REVIEW OF SYSTEMS  Review of Systems     All systems reviewed and negative except for those discussed in HPI.       PHYSICAL EXAM    I have reviewed the triage vital signs and nursing notes.    ED Triage Vitals   Temp Heart Rate Resp BP SpO2   02/10/23 0857 02/10/23 0857 02/10/23 0857 02/10/23 0906 02/10/23 0857   99.6 °F (37.6 °C) (!) 122 18 154/94 100 %       Physical Exam  GENERAL: Well appearing, nontoxic appearing, not distressed  HENT: normocephalic, atraumatic  EYES: no scleral icterus, PERRL  CV: regular rhythm, tachycardic no murmur  RESPIRATORY: normal effort, CTAB  ABDOMEN: soft, nontender  MUSCULOSKELETAL: no deformity, no calf tenderness  or lower extremity edema laterally  NEURO: alert, moves all extremities, follows commands, mental status normal/baseline  SKIN: warm, dry, no rash   Psych: Appropriate mood and affect  Nursing notes and vital signs reviewed          LAB RESULTS  Recent Results (from the past 24 hour(s))   ECG 12 Lead Other; palpations    Collection Time: 02/10/23  9:00 AM   Result Value Ref Range    QT Interval 290 ms   Comprehensive Metabolic Panel    Collection Time: 02/10/23  9:23 AM    Specimen: Blood   Result Value Ref Range    Glucose 100 (H) 65 - 99 mg/dL    BUN 11 6 - 20 mg/dL    Creatinine 1.09 0.76 - 1.27 mg/dL    Sodium 143 136 - 145 mmol/L    Potassium 4.0 3.5 - 5.2 mmol/L    Chloride 105 98 - 107 mmol/L    CO2 26.6 22.0 - 29.0 mmol/L    Calcium 9.7 8.6 - 10.5 mg/dL    Total Protein 7.1 6.0 - 8.5 g/dL    Albumin 4.5 3.5 - 5.2 g/dL    ALT (SGPT) 21 1 - 41 U/L    AST (SGOT) 17 1 - 40 U/L    Alkaline Phosphatase 78 39 - 117 U/L    Total Bilirubin 0.4 0.0 - 1.2 mg/dL    Globulin 2.6 gm/dL    A/G Ratio 1.7 g/dL    BUN/Creatinine Ratio 10.1 7.0 - 25.0    Anion Gap 11.4 5.0 - 15.0 mmol/L    eGFR 96.6 >60.0 mL/min/1.73   D-dimer, Quantitative    Collection Time: 02/10/23  9:23 AM    Specimen: Blood   Result Value Ref Range    D-Dimer, Quantitative <0.27 0.00 - 0.50 MCGFEU/mL   Single High Sensitivity Troponin T    Collection Time: 02/10/23  9:23 AM    Specimen: Blood   Result Value Ref Range    HS Troponin T <6 <15 ng/L   CBC Auto Differential    Collection Time: 02/10/23  9:23 AM    Specimen: Blood   Result Value Ref Range    WBC 7.37 3.40 - 10.80 10*3/mm3    RBC 5.29 4.14 - 5.80 10*6/mm3    Hemoglobin 14.7 13.0 - 17.7 g/dL    Hematocrit 44.2 37.5 - 51.0 %    MCV 83.6 79.0 - 97.0 fL    MCH 27.8 26.6 - 33.0 pg    MCHC 33.3 31.5 - 35.7 g/dL    RDW 12.7 12.3 - 15.4 %    RDW-SD 37.4 37.0 - 54.0 fl    MPV 9.4 6.0 - 12.0 fL    Platelets 197 140 - 450 10*3/mm3    Neutrophil % 73.5 42.7 - 76.0 %    Lymphocyte % 14.5 (L) 19.6 - 45.3 %     Monocyte % 10.2 5.0 - 12.0 %    Eosinophil % 0.9 0.3 - 6.2 %    Basophil % 0.4 0.0 - 1.5 %    Immature Grans % 0.5 0.0 - 0.5 %    Neutrophils, Absolute 5.41 1.70 - 7.00 10*3/mm3    Lymphocytes, Absolute 1.07 0.70 - 3.10 10*3/mm3    Monocytes, Absolute 0.75 0.10 - 0.90 10*3/mm3    Eosinophils, Absolute 0.07 0.00 - 0.40 10*3/mm3    Basophils, Absolute 0.03 0.00 - 0.20 10*3/mm3    Immature Grans, Absolute 0.04 0.00 - 0.05 10*3/mm3    nRBC 0.0 0.0 - 0.2 /100 WBC   TSH    Collection Time: 02/10/23  9:23 AM    Specimen: Blood   Result Value Ref Range    TSH 0.832 0.270 - 4.200 uIU/mL   T4, Free    Collection Time: 02/10/23  9:23 AM    Specimen: Blood   Result Value Ref Range    Free T4 1.31 0.93 - 1.70 ng/dL   Urinalysis With Microscopic If Indicated (No Culture) - Urine, Clean Catch    Collection Time: 02/10/23 11:00 AM    Specimen: Urine, Clean Catch   Result Value Ref Range    Color, UA Yellow Yellow, Straw    Appearance, UA Clear Clear    pH, UA 8.0 5.0 - 8.0    Specific Gravity, UA 1.010 1.005 - 1.030    Glucose, UA Negative Negative    Ketones, UA Negative Negative    Bilirubin, UA Negative Negative    Blood, UA Negative Negative    Protein, UA Negative Negative    Leuk Esterase, UA Negative Negative    Nitrite, UA Negative Negative    Urobilinogen, UA 0.2 E.U./dL 0.2 - 1.0 E.U./dL   Holter Monitor - 72 Hour Up To 15 Days    Collection Time: 02/10/23 11:42 AM   Result Value Ref Range    Target HR (85%) 166 bpm    Max. Pred. HR (100%) 195 bpm       Ordered the above labs and independently reviewed the results.        RADIOLOGY  XR Chest 1 View    Result Date: 2/10/2023  XR CHEST 1 VW-  HISTORY:  Palpitations, hypertension.  COMPARISON:  None  FINDINGS:  A single view of the chest was obtained.  Support Devices:  None. Cardiac Silhouette/Mediastinum/Edith:  The cardiac, mediastinal, and hilar contours are within normal limits. Lungs/Pleural Spaces:  The lungs and pleural spaces are clear. Chest Wall/Diaphragm/Upper  Abdomen:  The thoracic musculoskeletal structures and the upper abdomen are within normal limits.   CONCLUSION(S):   1.  No focal consolidation or effusion.  This report was finalized on 2/10/2023 9:36 AM by Dr. Lindsay Erazo M.D.        I ordered the above noted radiological studies. Reviewed by me and discussed with radiologist.  See dictation for official radiology interpretation.      PROCEDURES    Procedures    DIFFERENTIAL DIAGNOSIS:  Differential diagnoses include but are not limited to the following: Palpitations, SVT, arrhythmia, viral illness, electrolyte abnormality, sinus tachycardia    PROGRESS, DATA ANALYSIS, CONSULTS, AND MEDICAL DECISION MAKING    All labs have been independently reviewed by me.  All radiology studies have been reviewed by me and discussed with radiologist dictating the report.   EKG's independently viewed and interpreted by me.  Discussion below represents my analysis of pertinent findings related to patient's condition, differential diagnosis, treatment plan and final disposition.        ED Course as of 02/10/23 1519   Fri Feb 10, 2023   0924 Patient is a well-appearing 23-year-old who presents today with elevated heart rate and palpitations for the past 2 days.  He states that about once a month for the past 6 months he will have an episode of this that typically last about an hour or 2 and then his heart rate returns to normal.  Plan for cardiac work-up including D-dimer, chest x-ray. [MS]   1007 HS Troponin T: <6 [MS]   1007 D-Dimer, Quant: <0.27 [MS]   1007 WBC: 7.37 [MS]   1007 Hemoglobin: 14.7 [MS]   1007 Hematocrit: 44.2 [MS]   1155 Patient updated on unremarkable work-up done here today including labs, EKG, chest x-ray.  Patient's heart rate down in the 90s and he states he is feeling much better at this time.  I did discuss with him that we will be sending him home with a Holter monitor to wear, he will be given cardiology follow-up.  I discussed beta-blocker medication  with him, he states he would like to wait see what the Holter monitor shows in follow-up with cardiology before starting a new medication.  I feel this is a fair plan.  Patient will be discharged home in stable condition with strict return to ER precautions. [MS]      ED Course User Index  [MS] Elena Fuentes NASIM, APRN         DISPOSITION  ED Disposition     ED Disposition   Discharge    Condition   Stable    Comment   --           Discussed plan for discharge, as there is no emergent indication for admission. Pt/family is agreeable and understands need for follow up and repeat testing.  Pt is aware that discharge does not mean that nothing is wrong but it indicates no emergency is present that requires admission and they must continue care with follow-up as given below or physician of their choice.   Patient/Family voiced understanding of above instructions.  Patient discharged in stable condition.    DIAGNOSIS  Final diagnoses:   Palpitations   Tachycardia       FOLLOW UP   Shira Patel MD  [address] [facility] [id number] [phone]    Call today      [facility] CARDIOLOGY  [address]  [address]  [place] [facility] [address] [phone]  Call today        RX     Medication List      No changes were made to your prescriptions during this visit.             AS OF 15:19 EST VITALS:    BP - 124/85  HR - 90  TEMP - 99.6 °F (37.6 °C) (Tympanic)  O2 SATS - 99%      MEDICATIONS GIVEN IN ER    Medications   sodium chloride 0.9 % bolus 1,000 mL (0 mL Intravenous Stopped 2/10/23 1244)                Note Disclaimer: At [facility], we believe that sharing information builds trust and better relationships. You are receiving this note because you recently visited [facility]. It is possible you will see health information before a provider has talked with you about it. This kind of information can be easy to misunderstand. To help you fully understand what it means for  your health, we urge you to discuss this note with your provider.       Elena Fuentes, APRN  02/10/23 1523

## 2023-02-14 ENCOUNTER — TELEPHONE (OUTPATIENT)
Dept: CARDIOLOGY | Facility: CLINIC | Age: 23
End: 2023-02-14
Payer: COMMERCIAL

## 2023-02-20 ENCOUNTER — OFFICE VISIT (OUTPATIENT)
Dept: CARDIOLOGY | Facility: CLINIC | Age: 23
End: 2023-02-20
Payer: COMMERCIAL

## 2023-02-20 VITALS
HEIGHT: 71 IN | DIASTOLIC BLOOD PRESSURE: 60 MMHG | WEIGHT: 213 LBS | HEART RATE: 102 BPM | BODY MASS INDEX: 29.82 KG/M2 | SYSTOLIC BLOOD PRESSURE: 132 MMHG

## 2023-02-20 DIAGNOSIS — R00.2 PALPITATIONS: Primary | ICD-10-CM

## 2023-02-20 PROCEDURE — 99204 OFFICE O/P NEW MOD 45 MIN: CPT | Performed by: INTERNAL MEDICINE

## 2023-02-20 PROCEDURE — 93000 ELECTROCARDIOGRAM COMPLETE: CPT | Performed by: INTERNAL MEDICINE

## 2023-02-20 RX ORDER — ERENUMAB-AOOE 140 MG/ML
1 INJECTION, SOLUTION SUBCUTANEOUS
COMMUNITY
Start: 2022-11-29

## 2023-02-20 RX ORDER — AMITRIPTYLINE HYDROCHLORIDE 10 MG/1
1 TABLET, FILM COATED ORAL DAILY
COMMUNITY
Start: 2023-02-13

## 2023-02-20 RX ORDER — SARECYCLINE HYDROCHLORIDE 100 MG/1
1 TABLET, COATED ORAL
COMMUNITY
Start: 2022-12-30 | End: 2023-03-02

## 2023-02-20 NOTE — PROGRESS NOTES
Milton Islas  2000  Date of Office Visit: 02/20/23  Encounter Provider: Kei Dsouza MD  Place of Service: [facility] CARDIOLOGY      CHIEF COMPLAINT:  Sinus tachycardia.    HISTORY OF PRESENT ILLNESS:   23-year-old with a medical history of ADHD, migraine headache, who presents to me secondary to intermittent sinus tachycardia.  His resting heart rate varies anywhere from 90 to 110 bpm.  He states that intermittently he will have sinus tachycardia at rest with heart rates up to 120-130 bpm.  He denies any chest pain or dyspnea associated with these episodes.  His labs been checked and he has no evidence of anemia, normal TSH, and normal D-dimer.  He has not had any recent infectious issues.    Denies any chest pain, orthopnea or PND  Review of Systems   Constitutional: Negative for fever and malaise/fatigue.   HENT: Negative for nosebleeds and sore throat.    Eyes: Negative for blurred vision and double vision.   Cardiovascular: Negative for chest pain, claudication, palpitations and syncope.   Respiratory: Negative for cough, shortness of breath and snoring.    Endocrine: Negative for cold intolerance, heat intolerance and polydipsia.   Skin: Negative for itching, poor wound healing and rash.   Musculoskeletal: Negative for joint pain, joint swelling, muscle weakness and myalgias.   Gastrointestinal: Negative for abdominal pain, melena, nausea and vomiting.   Neurological: Negative for light-headedness, loss of balance, seizures, vertigo and weakness.   Psychiatric/Behavioral: Negative for altered mental status and depression.          Past Medical History:   Diagnosis Date   • ADHD (attention deficit hyperactivity disorder)    • COVID-19    • Hearing loss    • Hematochezia    • Migraine    • Tachycardia    • Upper respiratory infection        The following portions of the patient's history were reviewed and updated as appropriate: Social history , Family history and  Surgical history     Current Outpatient Medications on File Prior to Visit   Medication Sig Dispense Refill   • Coenzyme Q10 (COQ10 PO) Take 250 mg by mouth Daily.     • dexmethylphenidate XR (FOCALIN XR) 15 MG 24 hr capsule Take by mouth Daily     • ISOtretinoin (ACCUTANE) 20 MG capsule Take 20 mg by mouth Daily.     • MAGNESIUM GLUCONATE PO Take  by mouth.     • multivitamin tablet tablet Take 1 tablet by mouth Daily.     • SUMAtriptan (IMITREX) 50 MG tablet Take 50 mg by mouth As Needed.     • Topiramate  MG capsule sustained-release 24 hr Take 100 mg by mouth Daily.     • [DISCONTINUED] Methylphenidate 17.3 MG Tablet Extended Release Dispersible Take  by mouth.     • [DISCONTINUED] sulfamethoxazole-trimethoprim (BACTRIM DS,SEPTRA DS) 800-160 MG per tablet   7     No current facility-administered medications on file prior to visit.       Allergies   Allergen Reactions   • Penicillins Rash       There were no vitals filed for this visit.  There is no height or weight on file to calculate BMI.   Constitutional:       Appearance: Well-developed.   Eyes:      General: No scleral icterus.     Conjunctiva/sclera: Conjunctivae normal.   HENT:      Head: Normocephalic and atraumatic.   Neck:      Thyroid: No thyromegaly.      Vascular: Normal carotid pulses. No carotid bruit, hepatojugular reflux or JVD.      Trachea: No tracheal deviation.   Pulmonary:      Effort: No respiratory distress.      Breath sounds: Normal breath sounds. No decreased breath sounds. No wheezing. No rhonchi. No rales.   Chest:      Chest wall: Not tender to palpatation.   Cardiovascular:      Normal rate. Regular rhythm.      No gallop.   Pulses:     Carotid: 2+ bilaterally.     Radial: 2+ bilaterally.     Femoral: 2+ bilaterally.     Dorsalis pedis: 2+ bilaterally.     Posterior tibial: 2+ bilaterally.  Edema:     Peripheral edema absent.   Abdominal:      General: Bowel sounds are normal. There is no distension.      Palpations: Abdomen  is soft.      Tenderness: There is no abdominal tenderness.   Musculoskeletal:         General: No deformity.      Cervical back: Normal range of motion and neck supple. Skin:     Findings: No erythema or rash.   Neurological:      Mental Status: Alert and oriented to person, place, and time.      Sensory: No sensory deficit.   Psychiatric:         Behavior: Behavior normal.            Lab Results   Component Value Date    WBC 7.37 02/10/2023    HGB 14.7 02/10/2023    HCT 44.2 02/10/2023    MCV 83.6 02/10/2023     02/10/2023       Lab Results   Component Value Date    GLUCOSE 100 (H) 02/10/2023    BUN 11 02/10/2023    CREATININE 1.09 02/10/2023    EGFR 96.6 02/10/2023    BCR 10.1 02/10/2023    K 4.0 02/10/2023    CO2 26.6 02/10/2023    CALCIUM 9.7 02/10/2023    PROTENTOTREF 7.0 03/06/2019    ALBUMIN 4.5 02/10/2023    LABIL2 2.7 03/06/2019    AST 17 02/10/2023    ALT 21 02/10/2023       Lab Results   Component Value Date    GLUCOSE 100 (H) 02/10/2023    CALCIUM 9.7 02/10/2023     02/10/2023    K 4.0 02/10/2023    CO2 26.6 02/10/2023     02/10/2023    BUN 11 02/10/2023    CREATININE 1.09 02/10/2023    EGFR 96.6 02/10/2023    BCR 10.1 02/10/2023    ANIONGAP 11.4 02/10/2023       No results found for: CHOL, CHLPL, TRIG, HDL, LDL, LDLDIRECT      ECG 12 Lead    Date/Time: 2/20/2023 11:03 AM  Performed by: Kei Dsouza MD  Authorized by: Kei Dsouza MD   Comparison: compared with previous ECG from 2/10/2023  Similar to previous ECG  Rhythm: sinus tachycardia  Rate: tachycardic                 DISCUSSION/SUMMARY   23-year-old with a medical history of ADHD, migraine headache, who presents to me secondary to intermittent sinus tachycardia.  His resting heart rate varies anywhere from 90 to 110 bpm.  He states that intermittently he will have sinus tachycardia at rest with heart rates up to 120-130 bpm.  He denies any chest pain or dyspnea associated with these episodes.  His labs been  checked and he has no evidence of anemia, normal TSH, and normal D-dimer.  He has not had any recent infectious issues.  I think that his heart rate is predominantly elevated secondary to his ADHD medication.    1.  Sinus tachycardia  - No evidence of anemia or hyperthyroidism  - Negative D-dimer  - Plan on transthoracic echocardiogram just to evaluate the left ventricular size and systolic function and make sure that this tachycardia is not compensatory due to decreased ejection fraction.    2.  ADHD: Assuming that echo is normal I would recommend consideration of dose decrease or alteration in ADHD medication

## 2023-02-22 LAB
MAXIMAL PREDICTED HEART RATE: 195 BPM
STRESS TARGET HR: 166 BPM

## 2023-02-22 PROCEDURE — 93244 EXT ECG>48HR<7D REV&INTERPJ: CPT | Performed by: INTERNAL MEDICINE

## 2023-03-02 ENCOUNTER — OFFICE VISIT (OUTPATIENT)
Dept: FAMILY MEDICINE CLINIC | Facility: CLINIC | Age: 23
End: 2023-03-02
Payer: COMMERCIAL

## 2023-03-02 VITALS
DIASTOLIC BLOOD PRESSURE: 83 MMHG | OXYGEN SATURATION: 99 % | BODY MASS INDEX: 28.84 KG/M2 | HEART RATE: 100 BPM | WEIGHT: 212.9 LBS | SYSTOLIC BLOOD PRESSURE: 145 MMHG | RESPIRATION RATE: 16 BRPM | TEMPERATURE: 98.9 F | HEIGHT: 72 IN

## 2023-03-02 DIAGNOSIS — F90.9 ATTENTION DEFICIT HYPERACTIVITY DISORDER (ADHD), UNSPECIFIED ADHD TYPE: ICD-10-CM

## 2023-03-02 DIAGNOSIS — G43.909 MIGRAINE WITHOUT STATUS MIGRAINOSUS, NOT INTRACTABLE, UNSPECIFIED MIGRAINE TYPE: ICD-10-CM

## 2023-03-02 DIAGNOSIS — I10 PRIMARY HYPERTENSION: ICD-10-CM

## 2023-03-02 DIAGNOSIS — R00.0 TACHYCARDIA: Primary | ICD-10-CM

## 2023-03-02 PROBLEM — I49.1 PREMATURE ATRIAL CONTRACTIONS: Status: ACTIVE | Noted: 2023-03-02

## 2023-03-02 PROCEDURE — 99214 OFFICE O/P EST MOD 30 MIN: CPT | Performed by: FAMILY MEDICINE

## 2023-03-02 RX ORDER — NEBIVOLOL 2.5 MG/1
2.5 TABLET ORAL DAILY
Qty: 30 TABLET | Refills: 2 | Status: SHIPPED | OUTPATIENT
Start: 2023-03-02 | End: 2023-03-20 | Stop reason: SINTOL

## 2023-03-02 NOTE — PATIENT INSTRUCTIONS
We are starting a low-dose of Bystolic and we will see how your heart rate and blood pressure do.  I do have some concerns over exactly what triggered all of this but it may have been related to sudden discontinuation and resumption of medications, but I am not 100% behind that.   Trokendi specifically is supposed to be titrated and tapered so it is reasonable to consider it contributing.  We are definitely focusing on the heart and we will come back to the ADHD question once things have settled.

## 2023-03-02 NOTE — PROGRESS NOTES
"Subjective     Milton Islas II is a 23 y.o. male who presents with   Chief Complaint   Patient presents with   • Hospital Follow Up Visit     Tachycardia, hypertension, and palpitations        History of Present Illness   Presents today for follow-up of tachycardia, premature atrial contractions, and elevated blood pressure.  He has a history of migraine headaches and had been maintained on Aimovig and Trokendi  mg for some time.  He ran out of those medications and within a week started noticing an elevated heart rate to 140.  He went to the emergency room and was evaluated and was seen in follow-up with cardiology.  He did resume Aimovig and Trokendi at 100 mg after being out of them for about 2 weeks.  The elevated heart rate persisted for several weeks and has only recently started improving.  During this timeframe he independently stopped his stimulant medication for his ADHD and has been doing okay with that.  He is concerned about his heart rate staying elevated and blood pressure having several elevated readings as high as 152 systolic.  He has avoided caffeine, does not report any increase in stress, and discontinued his stimulant medications.  He has an upcoming echocardiogram still to do.    The following data was reviewed by: Maryjane Yuan MD on 03/02/2023:  Common labs    Common Labs 2/10/23 2/10/23    0923 0923   Glucose  100 (A)   BUN  11   Creatinine  1.09   Sodium  143   Potassium  4.0   Chloride  105   Calcium  9.7   Albumin  4.5   Total Bilirubin  0.4   Alkaline Phosphatase  78   AST (SGOT)  17   ALT (SGPT)  21   WBC 7.37    Hemoglobin 14.7    Hematocrit 44.2    Platelets 197    (A) Abnormal value            Data reviewed: ER notes and cardiology notes and studies     Review of Systems     Objective     /83 (BP Location: Right arm, Patient Position: Sitting, Cuff Size: Adult)   Pulse 100   Temp 98.9 °F (37.2 °C) (Oral)   Resp 16   Ht 181.6 cm (71.5\")   Wt 96.6 kg " (212 lb 14.4 oz)   SpO2 99%   BMI 29.28 kg/m²     Physical Exam  Constitutional:       Appearance: Normal appearance.   Cardiovascular:      Rate and Rhythm: Regular rhythm. Tachycardia present.      Heart sounds: Normal heart sounds.   Pulmonary:      Effort: Pulmonary effort is normal.      Breath sounds: Normal breath sounds.   Neurological:      Mental Status: He is alert.   Psychiatric:         Behavior: Behavior normal.         Procedures     Assessment & Plan   Diagnoses and all orders for this visit:    1. Tachycardia (Primary)  Assessment & Plan:  With PAC's on the monitor, improving.     As long as your resting heart rate is staying under 100 with the Bystolic you can do the dental work on the 28th.      2. Primary hypertension  -     Discontinue: nebivolol (Bystolic) 2.5 MG tablet; Take 1 tablet by mouth Daily.  Dispense: 30 tablet; Refill: 2    3. Migraine without status migrainosus, not intractable, unspecified migraine type  Assessment & Plan:  Very well controlled with Aimovig and Trokendi.  He had been out and then resumed at full doses after about 2 weeks and controlled again.         4. Attention deficit hyperactivity disorder (ADHD), unspecified ADHD type  Assessment & Plan:  Off medications and doing okay for now.  Focusing on the heart.          Discussion    Patient Instructions   We are starting a low-dose of Bystolic and we will see how your heart rate and blood pressure do.  I do have some concerns over exactly what triggered all of this but it may have been related to sudden discontinuation and resumption of medications, but I am not 100% behind that.   Trokendi specifically is supposed to be titrated and tapered so it is reasonable to consider it contributing.  We are definitely focusing on the heart and we will come back to the ADHD question once things have settled.                Maryjane Yuan MD

## 2023-03-02 NOTE — ASSESSMENT & PLAN NOTE
With PAC's on the monitor, improving.     As long as your resting heart rate is staying under 100 with the Bystolic you can do the dental work on the 28th.

## 2023-03-02 NOTE — ASSESSMENT & PLAN NOTE
Very well controlled with Aimovig and Trokendi.  He had been out and then resumed at full doses after about 2 weeks and controlled again.

## 2023-03-07 ENCOUNTER — HOSPITAL ENCOUNTER (OUTPATIENT)
Dept: CARDIOLOGY | Facility: HOSPITAL | Age: 23
Discharge: HOME | End: 2023-03-07
Admitting: INTERNAL MEDICINE
Payer: COMMERCIAL

## 2023-03-07 VITALS
WEIGHT: 212.96 LBS | SYSTOLIC BLOOD PRESSURE: 120 MMHG | BODY MASS INDEX: 28.85 KG/M2 | DIASTOLIC BLOOD PRESSURE: 58 MMHG | HEART RATE: 74 BPM | HEIGHT: 72 IN

## 2023-03-07 DIAGNOSIS — R00.2 PALPITATIONS: ICD-10-CM

## 2023-03-07 LAB
AORTIC ARCH: 2 CM
ASCENDING AORTA: 2.4 CM
BH CV ECHO MEAS - ACS: 2.34 CM
BH CV ECHO MEAS - AO MAX PG: 8.4 MMHG
BH CV ECHO MEAS - AO MEAN PG: 4.7 MMHG
BH CV ECHO MEAS - AO ROOT DIAM: 3 CM
BH CV ECHO MEAS - AO V2 MAX: 144.8 CM/SEC
BH CV ECHO MEAS - AO V2 VTI: 26.9 CM
BH CV ECHO MEAS - AVA(I,D): 2.32 CM2
BH CV ECHO MEAS - EDV(CUBED): 86.6 ML
BH CV ECHO MEAS - EDV(MOD-SP2): 120 ML
BH CV ECHO MEAS - EDV(MOD-SP4): 97 ML
BH CV ECHO MEAS - EF(MOD-BP): 58.1 %
BH CV ECHO MEAS - EF(MOD-SP2): 58.3 %
BH CV ECHO MEAS - EF(MOD-SP4): 58.8 %
BH CV ECHO MEAS - ESV(CUBED): 22 ML
BH CV ECHO MEAS - ESV(MOD-SP2): 50 ML
BH CV ECHO MEAS - ESV(MOD-SP4): 40 ML
BH CV ECHO MEAS - FS: 36.6 %
BH CV ECHO MEAS - IVS/LVPW: 1.01 CM
BH CV ECHO MEAS - IVSD: 1.01 CM
BH CV ECHO MEAS - LAT PEAK E' VEL: 22.4 CM/SEC
BH CV ECHO MEAS - LV DIASTOLIC VOL/BSA (35-75): 44.5 CM2
BH CV ECHO MEAS - LV MASS(C)D: 150.8 GRAMS
BH CV ECHO MEAS - LV MAX PG: 3.7 MMHG
BH CV ECHO MEAS - LV MEAN PG: 1.9 MMHG
BH CV ECHO MEAS - LV SYSTOLIC VOL/BSA (12-30): 18.3 CM2
BH CV ECHO MEAS - LV V1 MAX: 96.1 CM/SEC
BH CV ECHO MEAS - LV V1 VTI: 17.1 CM
BH CV ECHO MEAS - LVIDD: 4.4 CM
BH CV ECHO MEAS - LVIDS: 2.8 CM
BH CV ECHO MEAS - LVOT AREA: 3.6 CM2
BH CV ECHO MEAS - LVOT DIAM: 2.15 CM
BH CV ECHO MEAS - LVPWD: 1 CM
BH CV ECHO MEAS - MED PEAK E' VEL: 12.3 CM/SEC
BH CV ECHO MEAS - MV A DUR: 0.12 SEC
BH CV ECHO MEAS - MV A MAX VEL: 45.5 CM/SEC
BH CV ECHO MEAS - MV DEC SLOPE: 484.6 CM/SEC2
BH CV ECHO MEAS - MV DEC TIME: 0.15 MSEC
BH CV ECHO MEAS - MV E MAX VEL: 94.8 CM/SEC
BH CV ECHO MEAS - MV E/A: 2.08
BH CV ECHO MEAS - MV MAX PG: 5 MMHG
BH CV ECHO MEAS - MV MEAN PG: 1.57 MMHG
BH CV ECHO MEAS - MV P1/2T: 70 MSEC
BH CV ECHO MEAS - MV V2 VTI: 28.3 CM
BH CV ECHO MEAS - MVA(P1/2T): 3.1 CM2
BH CV ECHO MEAS - MVA(VTI): 2.21 CM2
BH CV ECHO MEAS - PA ACC TIME: 0.08 SEC
BH CV ECHO MEAS - PA PR(ACCEL): 41 MMHG
BH CV ECHO MEAS - PA V2 MAX: 120.7 CM/SEC
BH CV ECHO MEAS - PULM A REVS DUR: 0.21 SEC
BH CV ECHO MEAS - PULM A REVS VEL: 30.5 CM/SEC
BH CV ECHO MEAS - PULM DIAS VEL: 63.3 CM/SEC
BH CV ECHO MEAS - PULM S/D: 1.08
BH CV ECHO MEAS - PULM SYS VEL: 68.6 CM/SEC
BH CV ECHO MEAS - RAP SYSTOLE: 8 MMHG
BH CV ECHO MEAS - RV MAX PG: 2 MMHG
BH CV ECHO MEAS - RV V1 MAX: 70.7 CM/SEC
BH CV ECHO MEAS - RV V1 VTI: 14.7 CM
BH CV ECHO MEAS - SI(MOD-SP2): 32.1 ML/M2
BH CV ECHO MEAS - SI(MOD-SP4): 26.1 ML/M2
BH CV ECHO MEAS - SUP REN AO DIAM: 1.8 CM
BH CV ECHO MEAS - SV(LVOT): 62.4 ML
BH CV ECHO MEAS - SV(MOD-SP2): 70 ML
BH CV ECHO MEAS - SV(MOD-SP4): 57 ML
BH CV ECHO MEAS - TAPSE (>1.6): 1.64 CM
BH CV ECHO MEASUREMENTS AVERAGE E/E' RATIO: 5.46
BH CV XLRA - RV BASE: 2.7 CM
BH CV XLRA - RV LENGTH: 9 CM
BH CV XLRA - RV MID: 2.9 CM
BH CV XLRA - TDI S': 14 CM/SEC
LEFT ATRIUM VOLUME INDEX: 20.3 ML/M2
MAXIMAL PREDICTED HEART RATE: 195 BPM
SINUS: 3 CM
STJ: 2.05 CM
STRESS TARGET HR: 166 BPM

## 2023-03-07 PROCEDURE — 93306 TTE W/DOPPLER COMPLETE: CPT | Performed by: INTERNAL MEDICINE

## 2023-03-07 PROCEDURE — 93306 TTE W/DOPPLER COMPLETE: CPT

## 2023-03-08 ENCOUNTER — TELEPHONE (OUTPATIENT)
Dept: CARDIOLOGY | Facility: CLINIC | Age: 23
End: 2023-03-08
Payer: COMMERCIAL

## 2023-03-08 NOTE — TELEPHONE ENCOUNTER
Milton Islas II returned call regarding echo results.  Chart review showed:        Reviewed results with patient and he verbalized understanding.    Dr. Dsouza,  Patient is asking when/if he needs to make a follow-up appointment with you?    Please let me know how you would like to proceed.    Thank you,  Laila RODARTE RN  Triage Nurse [facility] [place]

## 2023-03-20 ENCOUNTER — TELEPHONE (OUTPATIENT)
Dept: FAMILY MEDICINE CLINIC | Facility: CLINIC | Age: 23
End: 2023-03-20
Payer: COMMERCIAL

## 2023-03-20 RX ORDER — METOPROLOL SUCCINATE 50 MG/1
50 TABLET, EXTENDED RELEASE ORAL DAILY
Qty: 30 TABLET | Refills: 2 | Status: SHIPPED | OUTPATIENT
Start: 2023-03-20

## 2023-03-20 NOTE — TELEPHONE ENCOUNTER
I sent in another medication in the same class (metoprolol) and lets see if he can tolerate this better.

## 2023-03-20 NOTE — TELEPHONE ENCOUNTER
Caller: Milton Islas Young II    Relationship: Self    Best call back number: [phone]    What medications are you currently taking:   Current Outpatient Medications on File Prior to Visit   Medication Sig Dispense Refill   • Aimovig 140 MG/ML auto-injector Inject 1 mL under the skin into the appropriate area as directed Every 30 (Thirty) Days.     • amitriptyline (ELAVIL) 10 MG tablet Take 1 tablet by mouth Daily.     • Coenzyme Q10 (COQ10 PO) Take 250 mg by mouth Daily.     • dexmethylphenidate XR (FOCALIN XR) 15 MG 24 hr capsule Take by mouth Daily     • ISOtretinoin (ACCUTANE) 20 MG capsule Take 1 capsule by mouth Daily.     • MAGNESIUM GLUCONATE PO Take  by mouth.     • multivitamin (THERAGRAN) tablet tablet Take 1 tablet by mouth Daily.     • nebivolol (Bystolic) 2.5 MG tablet Take 1 tablet by mouth Daily. 30 tablet 2   • SUMAtriptan (IMITREX) 50 MG tablet Take 1 tablet by mouth As Needed.     • Topiramate  MG capsule sustained-release 24 hr Take 1 capsule by mouth Daily.       No current facility-administered medications on file prior to visit.          When did you start taking these medications: 03/03/23    Which medication are you concerned about: NEBIVOLOL    Who prescribed you this medication: DOCTOR ROYER     What are your concerns: PATIENT IS CALLING IN HE STATES THAT SINCE TAKING THIS MEDICATION HE CAN NOT STAY ASLEEP AT NIGHT.     How long have you had these concerns: FEW WEEKS NOW.

## 2023-05-23 ENCOUNTER — OFFICE VISIT (OUTPATIENT)
Dept: FAMILY MEDICINE CLINIC | Facility: CLINIC | Age: 23
End: 2023-05-23
Payer: COMMERCIAL

## 2023-05-23 VITALS
SYSTOLIC BLOOD PRESSURE: 105 MMHG | BODY MASS INDEX: 29.81 KG/M2 | RESPIRATION RATE: 16 BRPM | OXYGEN SATURATION: 99 % | WEIGHT: 220.1 LBS | DIASTOLIC BLOOD PRESSURE: 68 MMHG | HEIGHT: 72 IN | TEMPERATURE: 99.1 F | HEART RATE: 77 BPM

## 2023-05-23 DIAGNOSIS — R00.0 TACHYCARDIA: Primary | ICD-10-CM

## 2023-05-23 DIAGNOSIS — F90.2 ATTENTION DEFICIT HYPERACTIVITY DISORDER (ADHD), COMBINED TYPE: ICD-10-CM

## 2023-05-23 RX ORDER — ATOMOXETINE 40 MG/1
CAPSULE ORAL
Qty: 60 CAPSULE | Refills: 2 | Status: SHIPPED | OUTPATIENT
Start: 2023-05-23

## 2023-05-23 NOTE — PATIENT INSTRUCTIONS
Since you need something for focus and metoprolol is not a good fit for blood pressure, we decided to taper down on metoprolol and try a non- stimulant, strattera for your focus.     It can upset your stomach so only take one Strattera for 2 weeks or more depending on how you're feeling.     If we need a stimulant again, we'll consider amlodipine addition for the blood pressure.     If the neck pressure persists, we may  do an US vs CT.

## 2023-05-23 NOTE — PROGRESS NOTES
"Subjective  Answers for HPI/ROS submitted by the patient on 5/23/2023  What is the primary reason for your visit?: High Blood Pressure        Milton Islas II is a 23 y.o. male who presents with   Chief Complaint   Patient presents with   • Hypertension       History of Present Illness     HTN and tachycardia off Focalin mostly and if he takes Focalin feels like his blood pressure is high.  But when he takes metoprolol he feels tired and his throat is closing up.    His girlfriend suggested he could have a swollen lymph node causing the throat pressure.              Review of Systems     Objective     /68   Pulse 77   Temp 99.1 °F (37.3 °C) (Oral)   Resp 16   Ht 182.9 cm (72\")   Wt 99.8 kg (220 lb 1.6 oz)   SpO2 99%   BMI 29.85 kg/m²     Physical Exam  Constitutional:       Appearance: Normal appearance.   Cardiovascular:      Rate and Rhythm: Normal rate and regular rhythm.      Heart sounds: Normal heart sounds.   Pulmonary:      Effort: Pulmonary effort is normal.   Lymphadenopathy:      Comments: Maybe slight fullness on the left submandibular area but no discrete node felt.    Neurological:      Mental Status: He is alert.   Psychiatric:         Behavior: Behavior normal.         Procedures     Assessment & Plan   Diagnoses and all orders for this visit:    1. Tachycardia (Primary)  Assessment & Plan:  Seemingly resolved and off stimulants with side effects from metoprolol and a clear cardiac workup.       2. Attention deficit hyperactivity disorder (ADHD), combined type       Discussion    Patient Instructions   Since you need something for focus and metoprolol is not a good fit for blood pressure, we decided to taper down on metoprolol and try a non- stimulant, strattera for your focus.     It can upset your stomach so only take one Strattera for 2 weeks or more depending on how you're feeling.     If we need a stimulant again, we'll consider amlodipine addition for the blood pressure. "     If the neck pressure persists, we may  do an US vs CT.                   Maryjane Yuan MD

## 2023-05-23 NOTE — ASSESSMENT & PLAN NOTE
Seemingly resolved and off stimulants with side effects from metoprolol and a clear cardiac workup.

## 2023-08-25 ENCOUNTER — OFFICE VISIT (OUTPATIENT)
Dept: FAMILY MEDICINE CLINIC | Facility: CLINIC | Age: 23
End: 2023-08-25
Payer: COMMERCIAL

## 2023-08-25 VITALS
HEIGHT: 72 IN | WEIGHT: 222.5 LBS | HEART RATE: 87 BPM | DIASTOLIC BLOOD PRESSURE: 75 MMHG | BODY MASS INDEX: 30.14 KG/M2 | OXYGEN SATURATION: 99 % | TEMPERATURE: 98.6 F | SYSTOLIC BLOOD PRESSURE: 115 MMHG

## 2023-08-25 DIAGNOSIS — I10 ESSENTIAL HYPERTENSION: ICD-10-CM

## 2023-08-25 DIAGNOSIS — R00.0 TACHYCARDIA: ICD-10-CM

## 2023-08-25 DIAGNOSIS — R06.83 SNORING: ICD-10-CM

## 2023-08-25 DIAGNOSIS — Z79.899 ON ACCUTANE THERAPY: ICD-10-CM

## 2023-08-25 DIAGNOSIS — K92.1 HEMATOCHEZIA: Primary | ICD-10-CM

## 2023-08-25 DIAGNOSIS — R53.83 FATIGUE, UNSPECIFIED TYPE: ICD-10-CM

## 2023-08-25 DIAGNOSIS — R63.5 WEIGHT GAIN: ICD-10-CM

## 2023-08-25 PROBLEM — Z79.2 ON ACCUTANE THERAPY: Status: ACTIVE | Noted: 2023-08-25

## 2023-08-25 PROCEDURE — 99214 OFFICE O/P EST MOD 30 MIN: CPT | Performed by: FAMILY MEDICINE

## 2023-08-25 RX ORDER — METOPROLOL SUCCINATE 50 MG/1
50 TABLET, EXTENDED RELEASE ORAL DAILY
Qty: 90 TABLET | Refills: 1 | Status: SHIPPED | OUTPATIENT
Start: 2023-08-25

## 2023-08-25 NOTE — PROGRESS NOTES
"Subjective     Milton Islas II is a 23 y.o. male who presents with   Chief Complaint   Patient presents with    ADHD       Hypertension       ADHD unable to tolerate Strattera and not on Focalin.  Just going without and doing okay overall.    Blood in stools about the past year.  Did have a colonoscopy with Dr. Marion in 2018 and nothing was found.  Then started noticing some blood again about a year ago and then around then had some pain in the upper abdomen. No heartburn.  Possibly worse with spicy foods and with air travel.  Also notices more flatulence when that happens.  May occur at other times.  Always  gets flatulence with the belly pain.     Hypertension and tachycardia issues had resumed and he was feeling really bad with weakness and just feeling off.  Started back on metoprolol ER 50mg and this time tolerated it just fine and feels good.  He's been walking at lunch.  Noticing weight gain.              Review of Systems   Constitutional:  Positive for diaphoresis and fatigue.        Weight gain     Respiratory:          Snoring   Psychiatric/Behavioral:  Positive for sleep disturbance.       Objective     /75 (BP Location: Left arm, Patient Position: Sitting, Cuff Size: Large Adult)   Pulse 87   Temp 98.6 øF (37 øC) (Oral)   Ht 182.9 cm (72.01\")   Wt 101 kg (222 lb 8 oz)   SpO2 99%   BMI 30.17 kg/mý     Physical Exam  Constitutional:       Appearance: Normal appearance.   Cardiovascular:      Rate and Rhythm: Normal rate and regular rhythm.      Heart sounds: Normal heart sounds.   Pulmonary:      Effort: Pulmonary effort is normal.      Breath sounds: Normal breath sounds.   Neurological:      Mental Status: He is alert.   Psychiatric:         Behavior: Behavior normal.       Procedures     Assessment & Plan   Diagnoses and all orders for this visit:    1. Hematochezia (Primary)  -     Ambulatory Referral to Gastroenterology    2. On Accutane therapy  -     Comprehensive Metabolic " Panel; Future  -     CBC & Differential; Future  -     Lipid Panel; Future    3. Tachycardia  -     CBC & Differential; Future  -     TSH; Future    4. Essential hypertension  -     metoprolol succinate XL (Toprol XL) 50 MG 24 hr tablet; Take 1 tablet by mouth Daily.  Dispense: 90 tablet; Refill: 1  -     Comprehensive Metabolic Panel; Future  -     CBC & Differential; Future    5. Weight gain  -     TSH; Future    6. Fatigue, unspecified type  -     Comprehensive Metabolic Panel; Future  -     CBC & Differential; Future  -     Lipid Panel; Future  -     TSH; Future  -     Vitamin B12; Future         Discussion    Patient Instructions   I have ordered lab tests today.  You should receive a phone call or a [employer] message with those results.  If you have not heard from us in 7-10 days, please call the office.      We'll send labs to Dr. Lai (Dermatology) since you're on Accutane.               Maryjane Yuan MD

## 2023-08-29 ENCOUNTER — TELEPHONE (OUTPATIENT)
Dept: GASTROENTEROLOGY | Facility: CLINIC | Age: 23
End: 2023-08-29

## 2023-08-29 NOTE — TELEPHONE ENCOUNTER
Hub staff attempted to follow warm transfer process and was unsuccessful     Caller: Milton Islas II    Relationship to patient: Self    Best call back number: [phone]     Patient is needing: PATIENT RETURNED CALL FROM [facility] TO SCHEDULE APPT FROM REFERRAL. PER HUB WORK FLOW, PATIENT IS CONSIDERED NEW AND WE CAN'T SCHEDULE AS FOLLOW UP. PLEASE CALL PATIENT BACK ASAP TO ADVISE WITH SCHEDULING.

## 2023-08-30 LAB
ALBUMIN SERPL-MCNC: 4.8 G/DL (ref 4.3–5.2)
ALBUMIN/GLOB SERPL: 2.4 {RATIO} (ref 1.2–2.2)
ALP SERPL-CCNC: 73 IU/L (ref 44–121)
ALT SERPL-CCNC: 44 IU/L (ref 0–44)
AST SERPL-CCNC: 20 IU/L (ref 0–40)
BILIRUB SERPL-MCNC: 0.6 MG/DL (ref 0–1.2)
BUN SERPL-MCNC: 13 MG/DL (ref 6–20)
BUN/CREAT SERPL: 11 (ref 9–20)
CALCIUM SERPL-MCNC: 9.4 MG/DL (ref 8.7–10.2)
CHLORIDE SERPL-SCNC: 109 MMOL/L (ref 96–106)
CHOLEST SERPL-MCNC: 142 MG/DL (ref 100–199)
CO2 SERPL-SCNC: 22 MMOL/L (ref 20–29)
CREAT SERPL-MCNC: 1.15 MG/DL (ref 0.76–1.27)
EGFRCR SERPLBLD CKD-EPI 2021: 91 ML/MIN/1.73
GLOBULIN SER CALC-MCNC: 2 G/DL (ref 1.5–4.5)
GLUCOSE SERPL-MCNC: 99 MG/DL (ref 70–99)
HDLC SERPL-MCNC: 26 MG/DL
LDLC SERPL CALC-MCNC: 89 MG/DL (ref 0–99)
POTASSIUM SERPL-SCNC: 4.1 MMOL/L (ref 3.5–5.2)
PROT SERPL-MCNC: 6.8 G/DL (ref 6–8.5)
SODIUM SERPL-SCNC: 142 MMOL/L (ref 134–144)
TRIGL SERPL-MCNC: 149 MG/DL (ref 0–149)
TSH SERPL DL<=0.005 MIU/L-ACNC: 1.67 UIU/ML (ref 0.45–4.5)
VIT B12 SERPL-MCNC: 499 PG/ML (ref 232–1245)
VLDLC SERPL CALC-MCNC: 27 MG/DL (ref 5–40)

## 2023-09-28 ENCOUNTER — OFFICE VISIT (OUTPATIENT)
Dept: GASTROENTEROLOGY | Facility: CLINIC | Age: 23
End: 2023-09-28
Payer: COMMERCIAL

## 2023-09-28 VITALS
HEIGHT: 71 IN | BODY MASS INDEX: 30.88 KG/M2 | SYSTOLIC BLOOD PRESSURE: 120 MMHG | WEIGHT: 220.6 LBS | OXYGEN SATURATION: 99 % | DIASTOLIC BLOOD PRESSURE: 77 MMHG | TEMPERATURE: 97.8 F | HEART RATE: 67 BPM

## 2023-09-28 DIAGNOSIS — K62.5 RECTAL BLEEDING: ICD-10-CM

## 2023-09-28 DIAGNOSIS — R12 HEARTBURN: ICD-10-CM

## 2023-09-28 DIAGNOSIS — R19.7 DIARRHEA, UNSPECIFIED TYPE: Primary | ICD-10-CM

## 2023-09-28 NOTE — PROGRESS NOTES
"Chief Complaint  Abdominal Pain, Diarrhea, and Black or Bloody Stool    Subjective          History Of Present Illness:    Milton Islas II is a  23 y.o. male patient of Dr. Marion's last seen on 4/12/2019 for left lower quadrant pain and intermittent rectal bleeding.  Patient underwent colonoscopy for evaluation of rectal bleeding in which the entire colon appeared normal. No obvious hemorrhoids.    Patient presents today in the setting of similar symptoms that he had in 2019.  He began having rectal bleeding and change in bowel habits over the last year.  The rectal bleeding is intermittent and he will see it in the toilet and when he wipes with toilet paper.  He does report some right lower quadrant abdominal discomfort following his bowel movements.  He does report some urgency and most recently has had to wake up at night to have a bowel movement.    Patient does report occasional heartburn about once or twice a month.  He uses Tums and Pepto with relief.    Reviewed his most recent labs done by his PCP with a normal metabolic panel, TSH, normal all vitamin B12.    Patient reports his family history consist of a cousin on moms side has colon cancer, multiple cousins with IBD.  No direct family members with IBD or colon cancer.    Objective   Vital Signs:   /77   Pulse 67   Temp 97.8 °F (36.6 °C)   Ht 180.3 cm (71\")   Wt 100 kg (220 lb 9.6 oz)   SpO2 99%   BMI 30.77 kg/m²       Physical Exam  Constitutional:       Appearance: Normal appearance. He is normal weight.   HENT:      Head: Normocephalic.      Nose: Nose normal.      Mouth/Throat:      Mouth: Mucous membranes are moist.      Pharynx: Oropharynx is clear.   Eyes:      Conjunctiva/sclera: Conjunctivae normal.   Pulmonary:      Effort: Pulmonary effort is normal.   Abdominal:      General: Abdomen is flat. Bowel sounds are normal. There is no distension.      Palpations: Abdomen is soft.      Tenderness: There is abdominal tenderness. " There is guarding. There is no rebound.      Hernia: No hernia is present.   Musculoskeletal:         General: Normal range of motion.      Cervical back: Normal range of motion and neck supple.   Skin:     General: Skin is warm and dry.   Neurological:      General: No focal deficit present.      Mental Status: He is alert and oriented to person, place, and time.   Psychiatric:         Mood and Affect: Mood normal.         Thought Content: Thought content normal.        Result Review :   The following data was reviewed by: Bailee Jacinto PA-C on 09/28/2023:  CMP          2/10/2023    09:23 8/29/2023    07:26   CMP   Glucose 100  99    BUN 11  13    Creatinine 1.09  1.15    EGFR 96.6     Sodium 143  142    Potassium 4.0  4.1    Chloride 105  109    Calcium 9.7  9.4    Total Protein  6.8    Total Protein 7.1     Albumin 4.5  4.8    Globulin  2.0    Globulin 2.6     Total Bilirubin 0.4  0.6    Alkaline Phosphatase 78  73    AST (SGOT) 17  20    ALT (SGPT) 21  44    Albumin/Globulin Ratio 1.7     BUN/Creatinine Ratio 10.1  11    Anion Gap 11.4       TSH          2/10/2023    09:23 8/29/2023    07:26   TSH   TSH 0.832  1.670            Assessment and Plan    Diagnoses and all orders for this visit:    1. Diarrhea, unspecified type (Primary)  -     CBC & Differential  -     Celiac Comprehensive Panel  -     Calprotectin, Fecal - Stool, Per Rectum    2. Rectal bleeding  Overview:  Added automatically from request for surgery [id number]    Orders:  -     CBC & Differential  -     Celiac Comprehensive Panel    3. Heartburn       We will proceed with lab work including CBC and celiac panel in the setting of his diarrhea and rectal bleeding.  Check a fecal calprotectin to evaluate for inflammation.  In the meantime as we wait for results, we discussed trialing a daily fiber supplement and initiating over-the-counter Preparation H.  Depending on the above studies, we may need to proceed with repeat endoscopic  evaluation.  Patient does have some intermittent right lower quadrant abdominal discomfort that seems associated with bowel movements.  May consider a CT abdomen and pelvis to further evaluate if worsens or above work-up is unremarkable.  Recommend he try pepcid for intermittent heartburn. It overall seems infrequent and associated with diet.     Follow Up   Return in about 6 weeks (around 11/9/2023) for Bailee Michele PA-C.    Dragon dictation used throughout this note.            Bailee Michele PA-C   [facility]  [address] [place] [address]  Office: [phone]

## 2023-10-04 LAB
BASOPHILS # BLD AUTO: 0 X10E3/UL (ref 0–0.2)
BASOPHILS NFR BLD AUTO: 1 %
ENDOMYSIUM IGA SER QL: NEGATIVE
EOSINOPHIL # BLD AUTO: 0.1 X10E3/UL (ref 0–0.4)
EOSINOPHIL NFR BLD AUTO: 2 %
ERYTHROCYTE [DISTWIDTH] IN BLOOD BY AUTOMATED COUNT: 13 % (ref 11.6–15.4)
GLIADIN PEPTIDE IGA SER-ACNC: 11 UNITS (ref 0–19)
GLIADIN PEPTIDE IGG SER-ACNC: 4 UNITS (ref 0–19)
HCT VFR BLD AUTO: 46.8 % (ref 37.5–51)
HGB BLD-MCNC: 15.2 G/DL (ref 13–17.7)
IGA SERPL-MCNC: 157 MG/DL (ref 90–386)
IMM GRANULOCYTES # BLD AUTO: 0 X10E3/UL (ref 0–0.1)
IMM GRANULOCYTES NFR BLD AUTO: 0 %
LYMPHOCYTES # BLD AUTO: 2.5 X10E3/UL (ref 0.7–3.1)
LYMPHOCYTES NFR BLD AUTO: 45 %
MCH RBC QN AUTO: 28 PG (ref 26.6–33)
MCHC RBC AUTO-ENTMCNC: 32.5 G/DL (ref 31.5–35.7)
MCV RBC AUTO: 86 FL (ref 79–97)
MONOCYTES # BLD AUTO: 0.4 X10E3/UL (ref 0.1–0.9)
MONOCYTES NFR BLD AUTO: 7 %
NEUTROPHILS # BLD AUTO: 2.5 X10E3/UL (ref 1.4–7)
NEUTROPHILS NFR BLD AUTO: 45 %
PLATELET # BLD AUTO: 185 X10E3/UL (ref 150–450)
RBC # BLD AUTO: 5.42 X10E6/UL (ref 4.14–5.8)
TTG IGA SER-ACNC: <2 U/ML (ref 0–3)
TTG IGG SER-ACNC: 3 U/ML (ref 0–5)
WBC # BLD AUTO: 5.5 X10E3/UL (ref 3.4–10.8)

## 2023-10-06 ENCOUNTER — TELEPHONE (OUTPATIENT)
Dept: GASTROENTEROLOGY | Facility: CLINIC | Age: 23
End: 2023-10-06
Payer: COMMERCIAL

## 2023-10-06 NOTE — TELEPHONE ENCOUNTER
Call to patient per Bailee's results and recommendations.   Patient verbalized understanding         ----- Message from Bailee Michele PA-C sent at 10/5/2023  4:17 PM EDT -----  Please call the patient inform him that his celiac panel and CBC were normal.  No signs of anemia.  Will await his fecal calprotectin.

## 2023-10-07 LAB — CALPROTECTIN STL-MCNT: <5 UG/G (ref 0–120)

## 2023-10-09 ENCOUNTER — TELEPHONE (OUTPATIENT)
Dept: GASTROENTEROLOGY | Facility: CLINIC | Age: 23
End: 2023-10-09
Payer: COMMERCIAL

## 2023-10-09 NOTE — TELEPHONE ENCOUNTER
Call to patient and left vm (OK per  verbal release), per Bailee's results and recommendations.   Informed patient if they had questions or concerns to call back at [phone] option 1.        ----- Message from Bailee Michele PA-C sent at 10/9/2023 11:52 AM EDT -----  Please call the patient and let him know that his fecal calprotectin was normal.  Please inquire on how he is doing and if he is still having abdominal pain?

## 2023-11-10 ENCOUNTER — OFFICE VISIT (OUTPATIENT)
Dept: SLEEP MEDICINE | Facility: HOSPITAL | Age: 23
End: 2023-11-10
Payer: COMMERCIAL

## 2023-11-10 VITALS
OXYGEN SATURATION: 99 % | HEART RATE: 73 BPM | SYSTOLIC BLOOD PRESSURE: 119 MMHG | BODY MASS INDEX: 30.94 KG/M2 | WEIGHT: 221 LBS | HEIGHT: 71 IN | DIASTOLIC BLOOD PRESSURE: 66 MMHG

## 2023-11-10 DIAGNOSIS — R06.83 SNORING: Primary | ICD-10-CM

## 2023-11-10 DIAGNOSIS — G47.9 SLEEP DISTURBANCES: ICD-10-CM

## 2023-11-10 DIAGNOSIS — E66.9 OBESITY (BMI 30-39.9): ICD-10-CM

## 2023-11-10 PROCEDURE — G0463 HOSPITAL OUTPT CLINIC VISIT: HCPCS

## 2023-11-10 NOTE — PROGRESS NOTES
[facility]  Telephone: [phone] / Fax: [phone] [place]  Telephone: [phone] / Fax: [phone] [place]    Referring Physician: Maryjane Yuan MD  PCP: Maryjane Yuan MD    Reason for consult:  sleep apnea    Milton Islas II is a 23 y.o.male  was seen in the [facility] today for evaluation of sleep apnea. He reports difficulty falling asleep, staying asleep, loud snoring. He wakes up around 2-3am, up for 30 minutes, and then wakes up every few hours. He is unaware of choking, gasping for breath during sleep. He has ADHD, previously on Focalin but had to stop due to HTN. He has Elavil prescribed for migraines. He reports GERD at night. He has history of irregular HR-PVC/PACs diagnosed in Feb 2023-on Metoprolol. He gained 35 lb in past few years and snoring has increased. ESS is 10.    SH- , 1 drink or less per week.    ROS-+irregular HR, +depression, +GERD, +bloating, +excessive thirst.    Milton Islas II  has a past medical history of ADHD (attention deficit hyperactivity disorder), COVID-19, Hearing loss, Hematochezia, Migraine, Tachycardia, and Upper respiratory infection.    Current Medications:    Current Outpatient Medications:     Aimovig 140 MG/ML auto-injector, Inject 1 mL under the skin into the appropriate area as directed Every 30 (Thirty) Days., Disp: , Rfl:     amitriptyline (ELAVIL) 10 MG tablet, Take 1 tablet by mouth Daily., Disp: , Rfl:     Coenzyme Q10 (COQ10 PO), Take 250 mg by mouth Daily., Disp: , Rfl:     ISOtretinoin (ACCUTANE) 20 MG capsule, Take 1 capsule by mouth Daily., Disp: , Rfl:     MAGNESIUM GLUCONATE PO, Take  by mouth., Disp: , Rfl:     metoprolol succinate XL (Toprol XL) 50 MG 24 hr tablet, Take 1 tablet by mouth Daily., Disp: 90 tablet, Rfl: 1    multivitamin (THERAGRAN) tablet tablet, Take 1 tablet by mouth Daily., Disp: , Rfl:     SUMAtriptan (IMITREX) 50 MG tablet,  "Take 1 tablet by mouth As Needed., Disp: , Rfl:     Topiramate  MG capsule sustained-release 24 hr, Take 1 capsule by mouth Daily., Disp: , Rfl:     I have reviewed Past Medical History, Past Surgical History, Medication List, Social History and Family History as entered in Sleep Questionnaire and EPIC.    ESS  10   Vital Signs /66   Pulse 73   Ht 180.3 cm (71\")   Wt 100 kg (221 lb)   SpO2 99%   BMI 30.82 kg/m²  Body mass index is 30.82 kg/m².    General Alert and oriented. No acute distress noted   Pharynx/Throat Class IV  Mallampati airway, large tongue, no evidence of redundant lateral pharyngeal tissue. No oral lesions. No thrush. Moist mucous membranes.   Head Normocephalic. Symmetrical. Atraumatic.    Nose No septal deviation. No drainage   Chest Wall Normal shape. Symmetric expansion with respiration. No tenderness.   Neck Trachea midline, no thyromegaly or adenopathy    Lungs Clear to auscultation bilaterally. No wheezes. No rhonchi. No rales. Respirations regular, even and unlabored.   Heart Regular rhythm and normal rate. Normal S1 and S2. No murmur   Abdomen Soft, non-tender and non-distended. Normal bowel sounds. No masses.   Extremities Moves all extremities well. No edema   Psychiatric Normal mood and affect.        Impression:  1. Snoring    2. Sleep disturbances    3. Obesity (BMI 30-39.9)          Plan:  I discussed the pathophysiology of obstructive sleep apnea with the patient.  We discussed the adverse outcomes associated with untreated sleep-disordered breathing.  We discussed treatment modalities of obstructive sleep apnea including CPAP device. Sleep study will be scheduled to establish a definitive diagnosis of sleep disorder breathing.  Weight loss will be strongly beneficial in order to reduce the severity of sleep-disordered breathing.  Patient has narrow oropharyngeal structure.  Caution during activities that require prolonged concentration is strongly advised.  After " sleep study results are available, patient will be notified, and appointment will be scheduled to discuss sleep study results and treatment recommendations.    I appreciate the opportunity to participate in this patient's care.      BROOKE Ashley  [facility]  Phone: [phone]      Part of this note may be an electronic transcription/translation of spoken language to printed text using the Dragon Dictation System. Some errors may exist even though the document was edited.

## 2023-11-27 ENCOUNTER — HOSPITAL ENCOUNTER (OUTPATIENT)
Dept: SLEEP MEDICINE | Facility: HOSPITAL | Age: 23
End: 2023-11-27
Payer: COMMERCIAL

## 2023-11-27 DIAGNOSIS — R06.83 SNORING: ICD-10-CM

## 2023-11-27 DIAGNOSIS — E66.9 OBESITY (BMI 30-39.9): ICD-10-CM

## 2023-11-27 DIAGNOSIS — G47.9 SLEEP DISTURBANCES: ICD-10-CM

## 2023-11-27 PROCEDURE — 95806 SLEEP STUDY UNATT&RESP EFFT: CPT

## 2023-11-29 ENCOUNTER — OFFICE VISIT (OUTPATIENT)
Dept: GASTROENTEROLOGY | Facility: CLINIC | Age: 23
End: 2023-11-29
Payer: COMMERCIAL

## 2023-11-29 ENCOUNTER — TELEPHONE (OUTPATIENT)
Dept: GASTROENTEROLOGY | Facility: CLINIC | Age: 23
End: 2023-11-29

## 2023-11-29 VITALS
WEIGHT: 223 LBS | HEIGHT: 71 IN | OXYGEN SATURATION: 99 % | HEART RATE: 76 BPM | DIASTOLIC BLOOD PRESSURE: 83 MMHG | SYSTOLIC BLOOD PRESSURE: 133 MMHG | TEMPERATURE: 96.6 F | BODY MASS INDEX: 31.22 KG/M2

## 2023-11-29 DIAGNOSIS — R12 HEARTBURN: Primary | ICD-10-CM

## 2023-11-29 DIAGNOSIS — K62.5 RECTAL BLEEDING: ICD-10-CM

## 2023-11-29 PROCEDURE — 99214 OFFICE O/P EST MOD 30 MIN: CPT | Performed by: PHYSICIAN ASSISTANT

## 2023-11-29 RX ORDER — FAMOTIDINE 40 MG/1
40 TABLET, FILM COATED ORAL DAILY
Qty: 30 TABLET | Refills: 10 | Status: SHIPPED | OUTPATIENT
Start: 2023-11-29

## 2023-11-29 NOTE — TELEPHONE ENCOUNTER
Hub staff attempted to follow warm transfer process and was unsuccessful     Caller: Milton Islas II    Relationship to patient: Self    Best call back number: [phone]    Patient is needing: PT IS RETURNING JADE'S CALL ABOUT COMING IN EARLIER TODAY. PT SAID HE CAN COME 30 MINS FROM NOW IF THAT WORKS. IT'S OK TO [facility].

## 2023-11-29 NOTE — PROGRESS NOTES
"Chief Complaint  Diarrhea and Rectal Bleeding    Subjective          History Of Present Illness:    Milton Islas II is a  23 y.o. male patient of Dr. Marion with a history of rectal bleeding and abdominal pain.     Prior work up consist of normal C-scope in 2018. Recent negative celiac panel and fecal calprotectin.     Patient reports he has been doing better since his last visit. Patient reports he had one episode of rectal bleeding following his last visit but no further bleeding after that. Patient did not try the preparation H suppositories. Patient currently denies abdominal pain, diarrhea, urgency or constipation. Patient reports he has made some changes to his diet which he thinks has helped with his bowel movements. No unintentional weight loss or reduced appetite.     Patient does report increased heartburn. Reports its worse with stress at work. No epigastric pain, dysphagia, odynophagia.     Objective   Vital Signs:   /83   Pulse 76   Temp 96.6 °F (35.9 °C) (Temporal)   Ht 180.3 cm (71\")   Wt 101 kg (223 lb)   SpO2 99%   BMI 31.10 kg/m²       Physical Exam  Vitals reviewed.   Constitutional:       General: He is not in acute distress.     Appearance: Normal appearance. He is not ill-appearing.   HENT:      Head: Normocephalic and atraumatic.      Nose: Nose normal.      Mouth/Throat:      Pharynx: Oropharynx is clear.   Eyes:      Extraocular Movements: Extraocular movements intact.      Conjunctiva/sclera: Conjunctivae normal.      Pupils: Pupils are equal, round, and reactive to light.   Pulmonary:      Effort: Pulmonary effort is normal.   Abdominal:      General: There is no distension.      Palpations: Abdomen is soft. There is no mass.      Tenderness: There is no abdominal tenderness.   Musculoskeletal:         General: No swelling. Normal range of motion.      Cervical back: Normal range of motion.   Skin:     General: Skin is warm and dry.      Findings: No bruising or rash. "   Neurological:      General: No focal deficit present.      Mental Status: He is alert and oriented to person, place, and time.      Motor: No weakness.      Gait: Gait normal.   Psychiatric:         Mood and Affect: Mood normal.          Result Review :   The following data was reviewed by: Bailee Jacinot PA-C on 11/29/2023:  CMP          2/10/2023    09:23 8/29/2023    07:26   CMP   Glucose 100  99    BUN 11  13    Creatinine 1.09  1.15    EGFR 96.6     Sodium 143  142    Potassium 4.0  4.1    Chloride 105  109    Calcium 9.7  9.4    Total Protein  6.8    Total Protein 7.1     Albumin 4.5  4.8    Globulin  2.0    Globulin 2.6     Total Bilirubin 0.4  0.6    Alkaline Phosphatase 78  73    AST (SGOT) 17  20    ALT (SGPT) 21  44    Albumin/Globulin Ratio 1.7     BUN/Creatinine Ratio 10.1  11    Anion Gap 11.4       CBC          2/10/2023    09:23 10/3/2023    07:35   CBC   WBC 7.37  5.5    RBC 5.29  5.42    Hemoglobin 14.7  15.2    Hematocrit 44.2  46.8    MCV 83.6  86    MCH 27.8  28.0    MCHC 33.3  32.5    RDW 12.7  13.0    Platelets 197  185      TSH          2/10/2023    09:23 8/29/2023    07:26   TSH   TSH 0.832  1.670            Assessment and Plan    Diagnoses and all orders for this visit:    1. Heartburn (Primary)  -     famotidine (Pepcid) 40 MG tablet; Take 1 tablet by mouth Daily.  Dispense: 30 tablet; Refill: 10    2. Rectal bleeding  Overview:  Added automatically from request for surgery [id number]         Trial of pepcid 40 mg for the next 8 weeks.   Antireflux measures and dietary modifications reviewed. Low acid diet reviewed. Keep head of bed elevated. Stop eating/drinking at least 3 hours prior to bedtime. Eliminate caffeine and carbonated beverages.  Weight loss encouraged if BMI over 25.  Rectal bleeding and altered bowel habits have essentially resolved with diet changes. We did discuss if he had any further bleeding or problems with his bowel movements we would likely proceed with repeat  colonoscopy at that point.   Follow up in 3 months.     Follow Up   Return in about 3 months (around 2/29/2024) for Bailee Michele PA-C.    Dragon dictation used throughout this note.            Bailee Michele PA-C   [facility]  [address] [place] [address]  Office: [phone]

## 2023-11-29 NOTE — TELEPHONE ENCOUNTER
Hub staff attempted to follow warm transfer process and was unsuccessful     Caller: Milton Islas II    Relationship to patient: Self    Best call back number: [phone]    Patient is needing: PT IS RETURNING JADE'S CALL ABOUT COMING IN EARLIER TODAY. PT SAID HE CAN COME 30 MINS FROM NOW IF THAT WORKS. IT'S OK TO [facility].

## 2023-12-05 DIAGNOSIS — K62.5 RECTAL BLEEDING: Primary | ICD-10-CM

## 2023-12-06 ENCOUNTER — TELEPHONE (OUTPATIENT)
Dept: GASTROENTEROLOGY | Facility: CLINIC | Age: 23
End: 2023-12-06
Payer: COMMERCIAL

## 2023-12-06 NOTE — TELEPHONE ENCOUNTER
COLONOSCOPY on  2/5/2024  arrive at 7:30  . Sent prep instructions to pt mail address ....[email or web address]

## 2023-12-06 NOTE — TELEPHONE ENCOUNTER
Caller: Milton Islas II    Relationship: Self    Best call back number: [phone] (Mobile)    What is the best time to reach you: ANYTIME    Who are you requesting to speak with (clinical staff, provider,  specific staff member): SCHEDULING    Do you know the name of the person who called: IZAIHA    What was the call regarding: SCHEDULING COLONSCOPY    Is it okay if the provider responds through MyChart: NA

## 2023-12-13 ENCOUNTER — TELEPHONE (OUTPATIENT)
Dept: SLEEP MEDICINE | Facility: HOSPITAL | Age: 23
End: 2023-12-13
Payer: COMMERCIAL

## 2023-12-15 ENCOUNTER — OFFICE VISIT (OUTPATIENT)
Dept: FAMILY MEDICINE CLINIC | Facility: CLINIC | Age: 23
End: 2023-12-15
Payer: COMMERCIAL

## 2023-12-15 VITALS
BODY MASS INDEX: 30.38 KG/M2 | OXYGEN SATURATION: 100 % | DIASTOLIC BLOOD PRESSURE: 78 MMHG | WEIGHT: 217 LBS | HEART RATE: 89 BPM | SYSTOLIC BLOOD PRESSURE: 136 MMHG | HEIGHT: 71 IN

## 2023-12-15 DIAGNOSIS — F90.2 ATTENTION DEFICIT HYPERACTIVITY DISORDER (ADHD), COMBINED TYPE: ICD-10-CM

## 2023-12-15 DIAGNOSIS — I10 ESSENTIAL HYPERTENSION: ICD-10-CM

## 2023-12-15 DIAGNOSIS — K92.1 HEMATOCHEZIA: ICD-10-CM

## 2023-12-15 DIAGNOSIS — R00.0 TACHYCARDIA: ICD-10-CM

## 2023-12-15 DIAGNOSIS — Z23 ENCOUNTER FOR VACCINATION: Primary | ICD-10-CM

## 2023-12-15 NOTE — PATIENT INSTRUCTIONS
Your heart rate was good today but with the blood pressure and tachycardia issues you've been having, I do have to wonder if Accutane could be contributing to these issues.  Since you get folliculitis issues mostly limited your scalp as soon as you stop taking Accutane, I wonder if there are other options.  There may not be other treatment options and it may not be the Accutane but it may be worth asking Dr. Lai.     We can reconsider treatment of ADHD if we can get the blood pressure and heart rate figured out.    Follow through with your colonoscopy.    We talked about raising your HDL with exercise and good fats like nuts, avocados and fish.

## 2023-12-15 NOTE — PROGRESS NOTES
"Subjective     Milton Islas II is a 23 y.o. male who presents with   Chief Complaint   Patient presents with    ADHD       History of Present Illness     Hypertension and tolerating half of the metoprolol ER 50mg this time.  He's exercising regularly with walking and has lost a six pounds. He's had a negative sleep study and a cardiac evaluation including an echocardiogram.     He's had some issues with blood in his stools and is to have another colonoscopy 2/5/24 with Dr. Watt    He's on Accutane and has been for several years due to folliculitis issues on his scalp.      ADHD currently untreated and managing but it's difficult.              Review of Systems     Objective     /78 (BP Location: Right arm, Patient Position: Sitting, Cuff Size: Large Adult)   Pulse 89   Ht 180.3 cm (70.98\")   Wt 98.4 kg (217 lb)   SpO2 100%   BMI 30.28 kg/m²     Physical Exam  Constitutional:       Appearance: Normal appearance.   Cardiovascular:      Rate and Rhythm: Normal rate and regular rhythm.      Heart sounds: Normal heart sounds.   Pulmonary:      Effort: Pulmonary effort is normal.      Breath sounds: Normal breath sounds.   Neurological:      Mental Status: He is alert.   Psychiatric:         Behavior: Behavior normal.         Procedures     Assessment & Plan   Diagnoses and all orders for this visit:    1. Encounter for vaccination (Primary)  -     COVID-19 F23 (Pfizer) 12yrs+ (COMIRNATY)  -     Fluzone (or Fluarix & Flulaval for VFC) >6mos    2. Essential hypertension  Assessment & Plan:  Controlled on 1/2 of metoprolol ER 50mg.       3. Tachycardia  Assessment & Plan:  Stable with metoprolol and possibly related to Accutane.       4. Hematochezia  Assessment & Plan:  With scheduled colonosocpy      5. Attention deficit hyperactivity disorder (ADHD), combined type  Assessment & Plan:  Currently untreated.            Discussion    Patient Instructions   Your heart rate was good today but with the " blood pressure and tachycardia issues you've been having, I do have to wonder if Accutane could be contributing to these issues.  Since you get folliculitis issues mostly limited your scalp as soon as you stop taking Accutane, I wonder if there are other options.  There may not be other treatment options and it may not be the Accutane but it may be worth asking Dr. Lai.     We can reconsider treatment of ADHD if we can get the blood pressure and heart rate figured out.    Follow through with your colonoscopy.    We talked about raising your HDL with exercise and good fats like nuts, avocados and fish.               Maryjane Yuan MD

## 2023-12-29 ENCOUNTER — TELEPHONE (OUTPATIENT)
Dept: GASTROENTEROLOGY | Facility: CLINIC | Age: 23
End: 2023-12-29
Payer: COMMERCIAL

## 2023-12-29 NOTE — TELEPHONE ENCOUNTER
CALLER: Milton Islas    RELATIONSHIP TO PATIENT: Self    BEST CALL BACK NUMBER: [phone]    CALL REGARDING:   LVM informing patient that Bailee Michele is out of office on 2/29/2024 at 1:00pm and need to reschedule appointment

## 2024-01-29 ENCOUNTER — TELEPHONE (OUTPATIENT)
Dept: GASTROENTEROLOGY | Facility: CLINIC | Age: 24
End: 2024-01-29
Payer: COMMERCIAL

## 2024-02-02 NOTE — TELEPHONE ENCOUNTER
.Provider: DR MALLORY    Caller: CARRILLO ELY    Relationship to Patient: SELF    Pharmacy:     Phone Number:     Reason for Call: PT CALLED TO CONFIRM APPT. 2/5.

## 2024-02-05 ENCOUNTER — ANESTHESIA (OUTPATIENT)
Dept: GASTROENTEROLOGY | Facility: HOSPITAL | Age: 24
End: 2024-02-05
Payer: COMMERCIAL

## 2024-02-05 ENCOUNTER — HOSPITAL ENCOUNTER (OUTPATIENT)
Facility: HOSPITAL | Age: 24
Setting detail: HOSPITAL OUTPATIENT SURGERY
Discharge: HOME | End: 2024-02-05
Attending: INTERNAL MEDICINE | Admitting: INTERNAL MEDICINE
Payer: COMMERCIAL

## 2024-02-05 ENCOUNTER — ANESTHESIA EVENT (OUTPATIENT)
Dept: GASTROENTEROLOGY | Facility: HOSPITAL | Age: 24
End: 2024-02-05
Payer: COMMERCIAL

## 2024-02-05 VITALS
SYSTOLIC BLOOD PRESSURE: 106 MMHG | RESPIRATION RATE: 16 BRPM | HEIGHT: 71 IN | WEIGHT: 212 LBS | BODY MASS INDEX: 29.68 KG/M2 | OXYGEN SATURATION: 98 % | DIASTOLIC BLOOD PRESSURE: 67 MMHG | HEART RATE: 75 BPM

## 2024-02-05 DIAGNOSIS — K62.5 RECTAL BLEEDING: ICD-10-CM

## 2024-02-05 PROCEDURE — 25010000002 PROPOFOL 10 MG/ML EMULSION

## 2024-02-05 PROCEDURE — 25810000003 LACTATED RINGERS PER 1000 ML: Performed by: INTERNAL MEDICINE

## 2024-02-05 RX ORDER — SODIUM CHLORIDE, SODIUM LACTATE, POTASSIUM CHLORIDE, CALCIUM CHLORIDE 600; 310; 30; 20 MG/100ML; MG/100ML; MG/100ML; MG/100ML
30 INJECTION, SOLUTION INTRAVENOUS CONTINUOUS PRN
Status: DISCONTINUED | OUTPATIENT
Start: 2024-02-05 | End: 2024-02-05 | Stop reason: HOSPADM

## 2024-02-05 RX ORDER — LIDOCAINE HYDROCHLORIDE 20 MG/ML
INJECTION, SOLUTION INFILTRATION; PERINEURAL AS NEEDED
Status: DISCONTINUED | OUTPATIENT
Start: 2024-02-05 | End: 2024-02-05 | Stop reason: SURG

## 2024-02-05 RX ORDER — PROPOFOL 10 MG/ML
VIAL (ML) INTRAVENOUS AS NEEDED
Status: DISCONTINUED | OUTPATIENT
Start: 2024-02-05 | End: 2024-02-05 | Stop reason: SURG

## 2024-02-05 RX ADMIN — LIDOCAINE HYDROCHLORIDE 60 MG: 20 INJECTION, SOLUTION INFILTRATION; PERINEURAL at 08:48

## 2024-02-05 RX ADMIN — PROPOFOL 250 MCG/KG/MIN: 10 INJECTION, EMULSION INTRAVENOUS at 08:50

## 2024-02-05 RX ADMIN — PROPOFOL 100 MG: 10 INJECTION, EMULSION INTRAVENOUS at 08:48

## 2024-02-05 RX ADMIN — SODIUM CHLORIDE, POTASSIUM CHLORIDE, SODIUM LACTATE AND CALCIUM CHLORIDE 30 ML/HR: 600; 310; 30; 20 INJECTION, SOLUTION INTRAVENOUS at 08:43

## 2024-02-05 NOTE — ANESTHESIA POSTPROCEDURE EVALUATION
Patient: Milton Vidal White II    Procedure Summary       Date: 02/05/24 Room / Location:  [facility]    Anesthesia Start: 0845 Anesthesia Stop: 0902    Procedure: COLONOSCOPY INTO CECUM AND TERMINAL ILEUM Diagnosis:       Rectal bleeding      (Rectal bleeding [K62.5])    Surgeons: Caesar Marion MD Provider: Karla White MD    Anesthesia Type: Not recorded ASA Status: 2            Anesthesia Type: No value filed.    Vitals  Vitals Value Taken Time   /67 02/05/24 0922   Temp     Pulse 71 02/05/24 0923   Resp 16 02/05/24 0922   SpO2 96 % 02/05/24 0923   Vitals shown include unfiled device data.        Post Anesthesia Care and Evaluation    Anesthetic complications: No anesthetic complications

## 2024-02-05 NOTE — ANESTHESIA PREPROCEDURE EVALUATION
Anesthesia Evaluation     no history of anesthetic complications:                Airway   Mallampati: II  TM distance: >3 FB  Neck ROM: full  Dental      Comment: Bonding front 2 upper teeth    Pulmonary    (+) ,recent URI  (-) shortness of breath, sleep apnea, not a smoker    ROS comment: snoring  Cardiovascular     (+) hypertension, dysrhythmias (stopped metoprolol a month ago)      Neuro/Psych  (+) headaches (migraine)  GI/Hepatic/Renal/Endo    (+) GI bleeding     Musculoskeletal     Abdominal    Substance History      OB/GYN          Other                    Anesthesia Plan    ASA 2     intravenous induction     Anesthetic plan, risks, benefits, and alternatives have been provided, discussed and informed consent has been obtained with: patient.    CODE STATUS:

## 2024-02-05 NOTE — H&P
[facility]  Pre Procedure History & Physical    Chief Complaint:   Rectal bleeding    Subjective     HPI:   23 y.o. male here for colonoscopy for evaluation of rectal bleeding    Past Medical History:   Past Medical History:   Diagnosis Date    ADHD (attention deficit hyperactivity disorder)     COVID-19     GI (gastrointestinal bleed) 2018    Hearing loss     Hematochezia     Hypertension 02/2023    Migraine     Tachycardia     Upper respiratory infection        Past Surgical History:  Past Surgical History:   Procedure Laterality Date    COLONOSCOPY N/A 05/29/2018    nodular ileal mucosa, no specimens collected    TONGUE SURGERY      TONSILLECTOMY      WISDOM TOOTH EXTRACTION         Family History:  Family History   Problem Relation Age of Onset    Breast cancer Mother     Hypertension Father     Breast cancer Maternal Grandmother     Lung cancer Maternal Grandfather     Irritable bowel syndrome Cousin     Colon cancer Cousin        Social History:   reports that he has never smoked. He has never used smokeless tobacco. He reports current alcohol use of about 1.0 standard drink of alcohol per week. He reports that he does not use drugs.    Medications:   Medications Prior to Admission   Medication Sig Dispense Refill Last Dose    Aimovig 140 MG/ML auto-injector Inject 1 mL under the skin into the appropriate area as directed Every 30 (Thirty) Days.       amitriptyline (ELAVIL) 10 MG tablet Take 1 tablet by mouth Daily.       Coenzyme Q10 (COQ10 PO) Take 250 mg by mouth Daily.       famotidine (Pepcid) 40 MG tablet Take 1 tablet by mouth Daily. 30 tablet 10     ISOtretinoin (ACCUTANE) 20 MG capsule Take 1 capsule by mouth Daily.       MAGNESIUM GLUCONATE PO Take  by mouth.       metoprolol succinate XL (Toprol XL) 50 MG 24 hr tablet Take 1 tablet by mouth Daily. 90 tablet 1     multivitamin (THERAGRAN) tablet tablet Take 1 tablet by mouth Daily.       SUMAtriptan (IMITREX) 50 MG tablet  Take 1 tablet by mouth As Needed.       Topiramate  MG capsule sustained-release 24 hr Take 1 capsule by mouth Daily.          Allergies:  Penicillins    ROS:    Pertinent items are noted in HPI     Objective     There were no vitals taken for this visit.    Physical Exam   Constitutional: Pt is oriented to person, place, and time and well-developed, well-nourished, and in no distress.   Mouth/Throat: Oropharynx is clear and moist.   Neck: Normal range of motion.   Cardiovascular: Normal rate, regular rhythm and normal heart sounds.    Pulmonary/Chest: Effort normal and breath sounds normal.   Abdominal: Soft. Nontender  Skin: Skin is warm and dry.   Psychiatric: Mood, memory, affect and judgment normal.     Assessment & Plan     Diagnosis:  Rectal bleeding    Anticipated Surgical Procedure:  Colonoscopy    The risks, benefits, and alternatives of this procedure have been discussed with the patient or the responsible party- the patient understands and agrees to proceed.

## 2024-02-12 ENCOUNTER — OFFICE VISIT (OUTPATIENT)
Dept: FAMILY MEDICINE CLINIC | Facility: CLINIC | Age: 24
End: 2024-02-12
Payer: COMMERCIAL

## 2024-02-12 VITALS
HEART RATE: 113 BPM | OXYGEN SATURATION: 100 % | WEIGHT: 220.6 LBS | SYSTOLIC BLOOD PRESSURE: 140 MMHG | TEMPERATURE: 100 F | DIASTOLIC BLOOD PRESSURE: 86 MMHG | BODY MASS INDEX: 30.88 KG/M2 | HEIGHT: 71 IN | RESPIRATION RATE: 16 BRPM

## 2024-02-12 DIAGNOSIS — J02.9 SORE THROAT: Primary | ICD-10-CM

## 2024-02-12 DIAGNOSIS — J06.9 URI, ACUTE: ICD-10-CM

## 2024-02-12 LAB
EXPIRATION DATE: NORMAL
EXPIRATION DATE: NORMAL
FLUAV AG NPH QL: NEGATIVE
FLUBV AG NPH QL: NEGATIVE
INTERNAL CONTROL: NORMAL
INTERNAL CONTROL: NORMAL
Lab: 7023
Lab: NORMAL
S PYO AG THROAT QL: NEGATIVE

## 2024-02-12 PROCEDURE — 99213 OFFICE O/P EST LOW 20 MIN: CPT | Performed by: FAMILY MEDICINE

## 2024-02-12 PROCEDURE — 87880 STREP A ASSAY W/OPTIC: CPT | Performed by: FAMILY MEDICINE

## 2024-02-12 PROCEDURE — 87804 INFLUENZA ASSAY W/OPTIC: CPT | Performed by: FAMILY MEDICINE

## 2024-02-14 ENCOUNTER — TELEPHONE (OUTPATIENT)
Dept: FAMILY MEDICINE CLINIC | Facility: CLINIC | Age: 24
End: 2024-02-14
Payer: COMMERCIAL

## 2024-02-14 RX ORDER — AZITHROMYCIN 250 MG/1
TABLET, FILM COATED ORAL
Qty: 6 TABLET | Refills: 0 | Status: SHIPPED | OUTPATIENT
Start: 2024-02-14

## 2024-03-29 ENCOUNTER — OFFICE VISIT (OUTPATIENT)
Dept: FAMILY MEDICINE CLINIC | Facility: CLINIC | Age: 24
End: 2024-03-29
Payer: COMMERCIAL

## 2024-03-29 VITALS
BODY MASS INDEX: 30.78 KG/M2 | OXYGEN SATURATION: 99 % | DIASTOLIC BLOOD PRESSURE: 77 MMHG | HEIGHT: 71 IN | SYSTOLIC BLOOD PRESSURE: 118 MMHG | WEIGHT: 219.9 LBS | HEART RATE: 88 BPM | RESPIRATION RATE: 16 BRPM

## 2024-03-29 DIAGNOSIS — I10 ESSENTIAL HYPERTENSION: ICD-10-CM

## 2024-03-29 DIAGNOSIS — K92.1 HEMATOCHEZIA: ICD-10-CM

## 2024-03-29 DIAGNOSIS — F90.0 ATTENTION DEFICIT HYPERACTIVITY DISORDER (ADHD), PREDOMINANTLY INATTENTIVE TYPE: Primary | ICD-10-CM

## 2024-03-29 DIAGNOSIS — Z23 ENCOUNTER FOR VACCINATION: ICD-10-CM

## 2024-03-29 PROBLEM — Z79.899 ON ACCUTANE THERAPY: Status: RESOLVED | Noted: 2023-08-25 | Resolved: 2024-03-29

## 2024-03-29 PROBLEM — Z79.2 ON ACCUTANE THERAPY: Status: RESOLVED | Noted: 2023-08-25 | Resolved: 2024-03-29

## 2024-03-29 PROBLEM — R00.0 TACHYCARDIA: Status: RESOLVED | Noted: 2023-03-02 | Resolved: 2024-03-29

## 2024-03-29 RX ORDER — SUMATRIPTAN 100 MG/1
100 TABLET, FILM COATED ORAL
COMMUNITY
Start: 2024-02-13

## 2024-03-29 RX ORDER — DEXMETHYLPHENIDATE HYDROCHLORIDE 10 MG/1
10 CAPSULE, EXTENDED RELEASE ORAL DAILY
Qty: 30 CAPSULE | Refills: 0 | Status: SHIPPED | OUTPATIENT
Start: 2024-03-29

## 2024-03-29 NOTE — ASSESSMENT & PLAN NOTE
We decided to try again with Focalin at a lower dose.   Hayes was reviewed and was appropriate.  Tox screen is up-to-date and is also appropriate.  Agreement signed.

## 2024-03-29 NOTE — PROGRESS NOTES
"Subjective     Milton Islas II is a 24 y.o. male who presents with   Chief Complaint   Patient presents with    Hypertension     Stopped taking Metoprolol due to bad reaction, his hands would get really red and blurry vision        History of Present Illness     Hypertension doing better recently.  He's bee off stimulants since March of 2023.  Off metoprolol since early January because his hands were turning red and blurry vision.  Blood pressure ws 93/63 the last day he took metoprolol.   Ran out of Aimovig at the end of January.  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  He has been walking 3 days a week for exercise which is an improvement.  He's been feeling pretty good overall the last 3 months.     He's not doing well with details and getting started with his projects.  ADHD with inattention and had been on Focalin for  several years until he started having issues with blood pressure and heart rate.  He has had an evaluation with Dr. Dsouza who did recommend trying a lower dose with resumption.                Review of Systems     Objective     /77 (BP Location: Right arm, Patient Position: Sitting, Cuff Size: Large Adult)   Pulse 88   Resp 16   Ht 180.3 cm (71\")   Wt 99.7 kg (219 lb 14.4 oz)   SpO2 99%   BMI 30.67 kg/m²     Physical Exam  Constitutional:       Appearance: Normal appearance.   Cardiovascular:      Rate and Rhythm: Normal rate and regular rhythm.      Heart sounds: Normal heart sounds.   Pulmonary:      Effort: Pulmonary effort is normal.      Breath sounds: Normal breath sounds.   Neurological:      Mental Status: He is alert.   Psychiatric:         Behavior: Behavior normal.         Procedures     Assessment & Plan   Diagnoses and all orders for this visit:    1. Attention deficit hyperactivity disorder (ADHD), predominantly inattentive type (Primary)  Assessment & Plan:  We decided to try again with Focalin at a lower dose.   Hayes was reviewed and was " appropriate.  Tox screen is up-to-date and is also appropriate.  Agreement signed.    Orders:  -     dexmethylphenidate XR (Focalin XR) 10 MG 24 hr capsule; Take 1 capsule by mouth Daily  Dispense: 30 capsule; Refill: 0    2. Essential hypertension  Assessment & Plan:  It seems to have resolved with starting a less stressful job.      3. Hematochezia  Assessment & Plan:  Was determined to be hemorrhoids on colonoscopy.       4. Encounter for vaccination  -     Tdap Vaccine Greater Than or Equal To 8yo IM         Discussion    Patient Instructions   You have been given a controlled substance.  These drugs are addicting and must be used with caution.  Use the drug only when needed and avoid operating heavy machinery or driving when using the medication until you know how you respond.  Dispose of the medication properly when you no longer need it.  I need to see you every 90 days, routine drug testing and Hayes monitoring will be done as required by [place] law.  If 90 days passes, I can not renew the medication. I recommend you schedule now.      We did your tetanus booster, it's good for 10 years unless you have a major injury.               Maryjane Yuan MD

## 2024-03-29 NOTE — PATIENT INSTRUCTIONS
You have been given a controlled substance.  These drugs are addicting and must be used with caution.  Use the drug only when needed and avoid operating heavy machinery or driving when using the medication until you know how you respond.  Dispose of the medication properly when you no longer need it.  I need to see you every 90 days, routine drug testing and Hayes monitoring will be done as required by [place] law.  If 90 days passes, I can not renew the medication. I recommend you schedule now.      We did your tetanus booster, it's good for 10 years unless you have a major injury.     As of right now, we're operating under the assumption that stress caused your blood pressure and we are cautiously resuming a lower dose of Focalin.

## 2024-04-01 ENCOUNTER — OFFICE VISIT (OUTPATIENT)
Dept: GASTROENTEROLOGY | Facility: CLINIC | Age: 24
End: 2024-04-01
Payer: COMMERCIAL

## 2024-04-01 VITALS
DIASTOLIC BLOOD PRESSURE: 77 MMHG | WEIGHT: 222 LBS | HEIGHT: 71 IN | TEMPERATURE: 98.6 F | SYSTOLIC BLOOD PRESSURE: 112 MMHG | BODY MASS INDEX: 31.08 KG/M2 | OXYGEN SATURATION: 99 % | HEART RATE: 77 BPM

## 2024-04-01 DIAGNOSIS — K62.5 RECTAL BLEEDING: Primary | ICD-10-CM

## 2024-04-01 PROCEDURE — 99212 OFFICE O/P EST SF 10 MIN: CPT | Performed by: PHYSICIAN ASSISTANT

## 2024-04-01 NOTE — PROGRESS NOTES
"Chief Complaint  Heartburn    Subjective          History Of Present Illness:    Milton Islas II is a  24 y.o. male of Dr. Robb who presents as a follow-up for rectal bleeding.    Patient is currently doing well.  He denies any further rectal bleeding.  He denies any diarrhea, constipation, abdominal pain, nausea, vomiting, reflux.  Appetite is good and weight is stable.    Colonoscopy reviewed from 2/5/2024 with anal papula and nonbleeding internal hemorrhoids.  There was normal.    Objective   Vital Signs:   /77   Pulse 77   Temp 98.6 °F (37 °C)   Ht 180.3 cm (71\")   Wt 101 kg (222 lb)   SpO2 99%   BMI 30.96 kg/m²       Physical Exam     Result Review :   The following data was reviewed by: Bailee Jacinto PA-C on 04/01/2024:  CMP          8/29/2023    07:26   CMP   Glucose 99    BUN 13    Creatinine 1.15    Sodium 142    Potassium 4.1    Chloride 109    Calcium 9.4    Total Protein 6.8    Albumin 4.8    Globulin 2.0    Total Bilirubin 0.6    Alkaline Phosphatase 73    AST (SGOT) 20    ALT (SGPT) 44    BUN/Creatinine Ratio 11      CBC          10/3/2023    07:35   CBC   WBC 5.5    RBC 5.42    Hemoglobin 15.2    Hematocrit 46.8    MCV 86    MCH 28.0    MCHC 32.5    RDW 13.0    Platelets 185            Assessment and Plan    Diagnoses and all orders for this visit:    1. Rectal bleeding (Primary)       Rectal bleeding due to hemorrhoids and has essentially resolved with daily fiber therapy.  Continue Metamucil daily and increase daily fiber intake with high-fiber meals.  No further recommendations at this time.  Patient advised to follow-up as needed    Follow Up   Return if symptoms worsen or fail to improve, for Dr. Marion or Paloma Santos PA-C.    Dragon dictation used throughout this note.            Bailee Michele PA-C   [facility], [place], operated by [facility]  [address] [place] [address]  Office: [phone]  "

## 2024-05-10 DIAGNOSIS — F90.0 ATTENTION DEFICIT HYPERACTIVITY DISORDER (ADHD), PREDOMINANTLY INATTENTIVE TYPE: ICD-10-CM

## 2024-05-10 RX ORDER — DEXMETHYLPHENIDATE HYDROCHLORIDE 10 MG/1
10 CAPSULE, EXTENDED RELEASE ORAL DAILY
Qty: 30 CAPSULE | Refills: 0 | Status: SHIPPED | OUTPATIENT
Start: 2024-05-10

## 2024-08-07 ENCOUNTER — OFFICE VISIT (OUTPATIENT)
Dept: FAMILY MEDICINE CLINIC | Facility: CLINIC | Age: 24
End: 2024-08-07
Payer: COMMERCIAL

## 2024-08-07 VITALS
BODY MASS INDEX: 30.46 KG/M2 | HEART RATE: 84 BPM | RESPIRATION RATE: 16 BRPM | HEIGHT: 71 IN | SYSTOLIC BLOOD PRESSURE: 132 MMHG | DIASTOLIC BLOOD PRESSURE: 84 MMHG | WEIGHT: 217.6 LBS | OXYGEN SATURATION: 100 %

## 2024-08-07 DIAGNOSIS — G43.009 MIGRAINE WITHOUT AURA AND WITHOUT STATUS MIGRAINOSUS, NOT INTRACTABLE: ICD-10-CM

## 2024-08-07 DIAGNOSIS — Z11.59 ENCOUNTER FOR HEPATITIS C SCREENING TEST FOR LOW RISK PATIENT: ICD-10-CM

## 2024-08-07 DIAGNOSIS — F90.0 ATTENTION DEFICIT HYPERACTIVITY DISORDER (ADHD), PREDOMINANTLY INATTENTIVE TYPE: Primary | ICD-10-CM

## 2024-08-07 DIAGNOSIS — Z13.220 NEED FOR LIPID SCREENING: ICD-10-CM

## 2024-08-07 PROCEDURE — 99213 OFFICE O/P EST LOW 20 MIN: CPT | Performed by: FAMILY MEDICINE

## 2024-08-07 RX ORDER — DEXMETHYLPHENIDATE HYDROCHLORIDE 10 MG/1
10 CAPSULE, EXTENDED RELEASE ORAL DAILY
Qty: 30 CAPSULE | Refills: 0 | Status: SHIPPED | OUTPATIENT
Start: 2024-08-07

## 2024-08-07 NOTE — PATIENT INSTRUCTIONS
You have been given a controlled substance.  These drugs are addicting and must be used with caution.  Use the drug only when needed and avoid operating heavy machinery or driving when using the medication until you know how you respond.  Dispose of the medication properly when you no longer need it.  I need to see you every 90 days, routine drug testing and Hayes monitoring will be done as required by [place] law.  If 90 days passes, I can not renew the medication. I recommend you schedule now.      Come back and get some fasting labs in the next month.     Be sure to get your flu and covid shots this fall.

## 2024-08-07 NOTE — PROGRESS NOTES
"Subjective     Milton Islas II is a 24 y.o. male who presents with   Chief Complaint   Patient presents with    ADHD       History of Present Illness     He's back at his old job.  Blood pressure has normalized.  Back on ADHD meds and doing well. On Focalin XR 10mg with help.     He's been eating more fiber and is not having any more bleeding.  He had a colonoscopy in 2/5/2024.    Still getting occasional migraines on Elavil, Aimovig, Trokendi and prn Sumatriptan.   Having migraines 1-2 times in a month.              Review of Systems     Objective     /84   Pulse 84   Resp 16   Ht 180.3 cm (71\")   Wt 98.7 kg (217 lb 9.6 oz)   SpO2 100%   BMI 30.35 kg/m²     Physical Exam  Constitutional:       Appearance: Normal appearance.   Cardiovascular:      Rate and Rhythm: Normal rate and regular rhythm.      Heart sounds: Normal heart sounds.   Pulmonary:      Effort: Pulmonary effort is normal.      Breath sounds: Normal breath sounds.   Neurological:      Mental Status: He is alert.   Psychiatric:         Behavior: Behavior normal.         Procedures     Assessment & Plan   Diagnoses and all orders for this visit:    1. Attention deficit hyperactivity disorder (ADHD), predominantly inattentive type (Primary)  -     Comprehensive Metabolic Panel; Future  -     CBC & Differential; Future  -     dexmethylphenidate XR (Focalin XR) 10 MG 24 hr capsule; Take 1 capsule by mouth Daily  Dispense: 30 capsule; Refill: 0  -     Urine Drug Screen - Urine, Clean Catch; Future    2. Migraine without aura and without status migrainosus, not intractable  Assessment & Plan:  Well controlled              3. Need for lipid screening  -     Comprehensive Metabolic Panel; Future  -     Lipid Panel; Future    4. Encounter for hepatitis C screening test for low risk patient  -     Hepatitis C Antibody; Future       BMI is >= 30 and <35. (Class 1 Obesity). The following options were offered after discussion;: weight loss " educational material (shared in after visit summary)     Discussion    Patient Instructions   You have been given a controlled substance.  These drugs are addicting and must be used with caution.  Use the drug only when needed and avoid operating heavy machinery or driving when using the medication until you know how you respond.  Dispose of the medication properly when you no longer need it.  I need to see you every 90 days, routine drug testing and Hayes monitoring will be done as required by [place] law.  If 90 days passes, I can not renew the medication. I recommend you schedule now.      Come back and get some fasting labs in the next month.     Be sure to get your flu and covid shots this fall.               Maryjane Yuan MD

## 2024-09-06 DIAGNOSIS — F90.0 ATTENTION DEFICIT HYPERACTIVITY DISORDER (ADHD), PREDOMINANTLY INATTENTIVE TYPE: ICD-10-CM

## 2024-09-06 RX ORDER — DEXMETHYLPHENIDATE HYDROCHLORIDE 10 MG/1
10 CAPSULE, EXTENDED RELEASE ORAL DAILY
Qty: 30 CAPSULE | Refills: 0 | Status: SHIPPED | OUTPATIENT
Start: 2024-09-06

## 2024-09-06 NOTE — TELEPHONE ENCOUNTER
Rx Refill Note  Requested Prescriptions     Pending Prescriptions Disp Refills    dexmethylphenidate XR (Focalin XR) 10 MG 24 hr capsule 30 capsule 0     Sig: Take 1 capsule by mouth Daily      Last office visit with prescribing clinician: 8/7/2024   Next office visit with prescribing clinician: 9/10/2024     Kael Hoover MA  09/06/24, 08:40 EDT

## 2024-09-10 ENCOUNTER — OFFICE VISIT (OUTPATIENT)
Dept: FAMILY MEDICINE CLINIC | Facility: CLINIC | Age: 24
End: 2024-09-10
Payer: COMMERCIAL

## 2024-09-10 VITALS
SYSTOLIC BLOOD PRESSURE: 121 MMHG | OXYGEN SATURATION: 99 % | DIASTOLIC BLOOD PRESSURE: 83 MMHG | HEIGHT: 71 IN | WEIGHT: 220.6 LBS | BODY MASS INDEX: 30.88 KG/M2 | HEART RATE: 78 BPM

## 2024-09-10 DIAGNOSIS — F90.0 ATTENTION DEFICIT HYPERACTIVITY DISORDER (ADHD), PREDOMINANTLY INATTENTIVE TYPE: ICD-10-CM

## 2024-09-10 DIAGNOSIS — F90.0 ATTENTION DEFICIT HYPERACTIVITY DISORDER (ADHD), PREDOMINANTLY INATTENTIVE TYPE: Primary | ICD-10-CM

## 2024-09-10 PROCEDURE — 99213 OFFICE O/P EST LOW 20 MIN: CPT | Performed by: FAMILY MEDICINE

## 2024-09-10 NOTE — PATIENT INSTRUCTIONS
I have ordered lab tests today.  You should receive a phone call or a [employer] message with those results.  If you have not heard from us in 7-10 days, please call the office.      You have been given a controlled substance.  These drugs are addicting and must be used with caution.  Use the drug only when needed and avoid operating heavy machinery or driving when using the medication until you know how you respond.  Dispose of the medication properly when you no longer need it.  I need to see you every 90 days, routine drug testing and Hayes monitoring will be done as required by [place] law.  If 90 days passes, I can not renew the medication. I recommend you schedule now.

## 2024-09-10 NOTE — PROGRESS NOTES
"Subjective     Milton Islas II is a 24 y.o. male who presents with   Chief Complaint   Patient presents with    ADHD       History of Present Illness     He is here for labs and ADHD follow up.  His next appointment is too late and this one is too early so we'll continue the current regimen. He is due for his urine tox as well.  He is doing well on the 10mg of Focalin XR.            Review of Systems     Objective     /83   Pulse 78   Ht 180.3 cm (71\")   Wt 100 kg (220 lb 9.6 oz)   SpO2 99%   BMI 30.77 kg/m²     Physical Exam  Constitutional:       Appearance: Normal appearance.   Cardiovascular:      Rate and Rhythm: Normal rate and regular rhythm.      Heart sounds: Normal heart sounds.   Pulmonary:      Effort: Pulmonary effort is normal.      Breath sounds: Normal breath sounds.   Neurological:      Mental Status: He is alert.   Psychiatric:         Behavior: Behavior normal.         Procedures     Assessment & Plan   There are no diagnoses linked to this encounter.         Discussion    Patient Instructions   I have ordered lab tests today.  You should receive a phone call or a [employer] message with those results.  If you have not heard from us in 7-10 days, please call the office.      You have been given a controlled substance.  These drugs are addicting and must be used with caution.  Use the drug only when needed and avoid operating heavy machinery or driving when using the medication until you know how you respond.  Dispose of the medication properly when you no longer need it.  I need to see you every 90 days, routine drug testing and Hayes monitoring will be done as required by [place] law.  If 90 days passes, I can not renew the medication. I recommend you schedule now.                Maryjane Yuan MD           "

## 2024-09-11 LAB
AMPHETAMINES UR QL SCN: NEGATIVE NG/ML
BARBITURATES UR QL SCN: NEGATIVE NG/ML
BENZODIAZ UR QL SCN: NEGATIVE NG/ML
BZE UR QL SCN: NEGATIVE NG/ML
CANNABINOIDS UR QL SCN: NEGATIVE NG/ML
CREAT UR-MCNC: 26.1 MG/DL (ref 20–300)
LABORATORY COMMENT REPORT: NORMAL
METHADONE UR QL SCN: NEGATIVE NG/ML
OPIATES UR QL SCN: NEGATIVE NG/ML
OXYCODONE+OXYMORPHONE UR QL SCN: NEGATIVE NG/ML
PCP UR QL: NEGATIVE NG/ML
PH UR: 6.4 [PH] (ref 4.5–8.9)
PROPOXYPH UR QL SCN: NEGATIVE NG/ML

## 2024-10-07 DIAGNOSIS — F90.0 ATTENTION DEFICIT HYPERACTIVITY DISORDER (ADHD), PREDOMINANTLY INATTENTIVE TYPE: ICD-10-CM

## 2024-10-07 NOTE — TELEPHONE ENCOUNTER
Rx Refill Note  Requested Prescriptions     Pending Prescriptions Disp Refills    dexmethylphenidate XR (Focalin XR) 10 MG 24 hr capsule 30 capsule 0     Sig: Take 1 capsule by mouth Daily      Last office visit with prescribing clinician: 9/10/2024   Next office visit with prescribing clinician: 2/5/2025     Kael Hoover MA  10/07/24, 13:20 EDT

## 2024-10-08 RX ORDER — DEXMETHYLPHENIDATE HYDROCHLORIDE 10 MG/1
10 CAPSULE, EXTENDED RELEASE ORAL DAILY
Qty: 30 CAPSULE | Refills: 0 | Status: SHIPPED | OUTPATIENT
Start: 2024-10-08

## 2024-11-15 ENCOUNTER — PATIENT MESSAGE (OUTPATIENT)
Dept: FAMILY MEDICINE CLINIC | Facility: CLINIC | Age: 24
End: 2024-11-15
Payer: COMMERCIAL

## 2025-02-05 ENCOUNTER — OFFICE VISIT (OUTPATIENT)
Dept: FAMILY MEDICINE CLINIC | Facility: CLINIC | Age: 25
End: 2025-02-05
Payer: COMMERCIAL

## 2025-02-05 VITALS
HEART RATE: 80 BPM | OXYGEN SATURATION: 100 % | BODY MASS INDEX: 30.66 KG/M2 | SYSTOLIC BLOOD PRESSURE: 118 MMHG | TEMPERATURE: 98.4 F | DIASTOLIC BLOOD PRESSURE: 64 MMHG | WEIGHT: 219 LBS | HEIGHT: 71 IN

## 2025-02-05 DIAGNOSIS — R00.2 PALPITATIONS: ICD-10-CM

## 2025-02-05 DIAGNOSIS — Z23 ENCOUNTER FOR VACCINATION: ICD-10-CM

## 2025-02-05 DIAGNOSIS — F90.0 ATTENTION DEFICIT HYPERACTIVITY DISORDER (ADHD), PREDOMINANTLY INATTENTIVE TYPE: Primary | ICD-10-CM

## 2025-02-05 PROCEDURE — 91320 SARSCV2 VAC 30MCG TRS-SUC IM: CPT | Performed by: FAMILY MEDICINE

## 2025-02-05 PROCEDURE — 90480 ADMN SARSCOV2 VAC 1/ONLY CMP: CPT | Performed by: FAMILY MEDICINE

## 2025-02-05 PROCEDURE — 99213 OFFICE O/P EST LOW 20 MIN: CPT | Performed by: FAMILY MEDICINE

## 2025-02-05 PROCEDURE — 90656 IIV3 VACC NO PRSV 0.5 ML IM: CPT | Performed by: FAMILY MEDICINE

## 2025-02-05 PROCEDURE — 90471 IMMUNIZATION ADMIN: CPT | Performed by: FAMILY MEDICINE

## 2025-02-05 RX ORDER — DEXMETHYLPHENIDATE HYDROCHLORIDE 10 MG/1
10 CAPSULE, EXTENDED RELEASE ORAL DAILY
Qty: 30 CAPSULE | Refills: 0 | Status: SHIPPED | OUTPATIENT
Start: 2025-02-05

## 2025-02-05 NOTE — PROGRESS NOTES
"Subjective     Milton Islas II is a 24 y.o. male who presents with   Chief Complaint   Patient presents with    Follow-up     3 month follow up     Med Refill     Check on ADD medication        History of Present Illness     He was having issues with heart palpitations.   He sent a rhythm strip showing PVC's.  Work was really stressful at the time and has settled after a couple weeks.     He's been out of dexmethylphenidate XR.  He stopped while having the PVC's and doesn't know that it helped. He's not as effective without it and got himself locked out of the house without it.  He really needs it in the morning and it eases off in the afternoon.              Review of Systems     Objective     /64 (BP Location: Right arm, Patient Position: Sitting, Cuff Size: Adult)   Pulse 80   Temp 98.4 °F (36.9 °C) (Oral)   Ht 180.3 cm (70.98\")   Wt 99.3 kg (219 lb)   SpO2 100%   BMI 30.56 kg/m²     Physical Exam  Constitutional:       Appearance: Normal appearance.   Cardiovascular:      Rate and Rhythm: Normal rate and regular rhythm.      Heart sounds: Normal heart sounds.   Pulmonary:      Effort: Pulmonary effort is normal.      Breath sounds: Normal breath sounds.   Neurological:      Mental Status: He is alert.   Psychiatric:         Behavior: Behavior normal.         Procedures     Assessment & Plan   Diagnoses and all orders for this visit:    1. Attention deficit hyperactivity disorder (ADHD), predominantly inattentive type (Primary)  Assessment & Plan:   Hayes was reviewed and was appropriate.  Tox screen is up-to-date and is also appropriate.  Controlled substance agreement on file.       Orders:  -     dexmethylphenidate XR (Focalin XR) 10 MG 24 hr capsule; Take 1 capsule by mouth Daily  Dispense: 30 capsule; Refill: 0    2. Palpitations    3. Encounter for vaccination  -     COVID-19 (Pfizer) 12yrs+ (COMIRNATY)  -     Fluzone >6mos (2054-3092)             Discussion    Patient Instructions   Aim " for 150 minutes of moderate exercise in a week.   Headspace, [employer] and [employer] are all websites and apps that are helpful to gain some meditation skills.   Both of these can help with stress.    If the palpitations worsen, we can look into it further.     We did your covid and flu shots.                Maryjane Yuan MD

## 2025-02-05 NOTE — PATIENT INSTRUCTIONS
Aim for 150 minutes of moderate exercise in a week.   [employer]sp[employer], [employer] Mindfulness and [employer] are all websites and apps that are helpful to gain some meditation skills.   Both of these can help with stress.    If the palpitations worsen, we can look into it further.     We did your covid and flu shots.

## 2025-02-05 NOTE — ASSESSMENT & PLAN NOTE
Hayes was reviewed and was appropriate.  Tox screen is up-to-date and is also appropriate.  Controlled substance agreement on file.

## 2025-03-09 DIAGNOSIS — F90.0 ATTENTION DEFICIT HYPERACTIVITY DISORDER (ADHD), PREDOMINANTLY INATTENTIVE TYPE: ICD-10-CM

## 2025-03-09 RX ORDER — DEXMETHYLPHENIDATE HYDROCHLORIDE 10 MG/1
10 CAPSULE, EXTENDED RELEASE ORAL DAILY
Qty: 30 CAPSULE | Refills: 0 | Status: SHIPPED | OUTPATIENT
Start: 2025-03-09

## 2025-05-07 ENCOUNTER — OFFICE VISIT (OUTPATIENT)
Dept: FAMILY MEDICINE CLINIC | Facility: CLINIC | Age: 25
End: 2025-05-07
Payer: COMMERCIAL

## 2025-05-07 VITALS
HEIGHT: 71 IN | HEART RATE: 86 BPM | WEIGHT: 215.8 LBS | DIASTOLIC BLOOD PRESSURE: 72 MMHG | BODY MASS INDEX: 30.21 KG/M2 | OXYGEN SATURATION: 99 % | SYSTOLIC BLOOD PRESSURE: 120 MMHG

## 2025-05-07 DIAGNOSIS — F90.0 ATTENTION DEFICIT HYPERACTIVITY DISORDER (ADHD), PREDOMINANTLY INATTENTIVE TYPE: Primary | ICD-10-CM

## 2025-05-07 DIAGNOSIS — L04.0 ACUTE LYMPHADENITIS OF NECK: ICD-10-CM

## 2025-05-07 DIAGNOSIS — R00.2 PALPITATIONS: ICD-10-CM

## 2025-05-07 PROCEDURE — 99214 OFFICE O/P EST MOD 30 MIN: CPT | Performed by: FAMILY MEDICINE

## 2025-05-07 RX ORDER — DEXMETHYLPHENIDATE HYDROCHLORIDE 10 MG/1
10 CAPSULE, EXTENDED RELEASE ORAL DAILY
Qty: 30 CAPSULE | Refills: 0 | Status: SHIPPED | OUTPATIENT
Start: 2025-05-07

## 2025-05-07 RX ORDER — TOPIRAMATE 50 MG/1
50 CAPSULE, EXTENDED RELEASE ORAL DAILY
COMMUNITY
Start: 2025-02-24 | End: 2026-02-19

## 2025-05-07 NOTE — ASSESSMENT & PLAN NOTE
Controlled substance agreement on file.   Hayes was reviewed and was appropriate.  Tox screen is up-to-date and is also appropriate.

## 2025-05-07 NOTE — PROGRESS NOTES
"Subjective     Milton Islas II is a 25 y.o. male who presents with   Chief Complaint   Patient presents with    ADHD       History of Present Illness     Recently engaged and figuring out a day for about a year from now.     Here for follow up of ADHD.  He skips meds on the weekends and with palpitations.       His palpitations came back for a little while.  His chest feels light.  He's had a cardiac evaluation with Dr. Dsouza in 2023 for this and he's overall back to normal.  We tried stopping caffeine and ADHD meds this and it took several weeks but resolved.  This was leading up to the engagement.  He does have some issue with anxiety leading up to big events.  He does find he gets anxious frequently at work.   He's been able to go back on the Curbsyin.  He had avoided caffeine overall but had been starting back on Dr. Pepper because it was available at work.     He did have a visible knot in his neck in March and has a history of cystic acne.  He's been off accutaine for a year.  The knot has decreased in size but is still present              Review of Systems     Objective     /72   Pulse 86   Ht 180.3 cm (70.98\")   Wt 97.9 kg (215 lb 12.8 oz)   SpO2 99%   BMI 30.11 kg/m²     Physical Exam  Constitutional:       Appearance: Normal appearance.   Cardiovascular:      Rate and Rhythm: Normal rate and regular rhythm.      Heart sounds: Normal heart sounds.   Pulmonary:      Effort: Pulmonary effort is normal.      Breath sounds: Normal breath sounds.   Neurological:      Mental Status: He is alert.   Psychiatric:         Behavior: Behavior normal.         Procedures     Assessment & Plan   Diagnoses and all orders for this visit:    1. Attention deficit hyperactivity disorder (ADHD), predominantly inattentive type (Primary)  Assessment & Plan:  Controlled substance agreement on file.   Hayes was reviewed and was appropriate.  Tox screen is up-to-date and is also appropriate.      Orders:  -     " dexmethylphenidate XR (Focalin XR) 10 MG 24 hr capsule; Take 1 capsule by mouth Daily  Dispense: 30 capsule; Refill: 0    2. Acute lymphadenitis of neck  Comments:  Improving on its own    3. Palpitations  Assessment & Plan:  He has had prior cardiac evaluation and this has resolved on its own at the moment               Discussion    Patient Instructions   Stay away from caffeine.    We did discuss considering treating anxiety if you find that it is a frequent issue.     You have been given a controlled substance.  These drugs are addicting and must be used with caution.  Use the drug only when needed and avoid operating heavy machinery or driving when using the medication until you know how you respond.  Dispose of the medication properly when you no longer need it.  I need to see you every 90 days, routine drug testing and Hayes monitoring will be done as required by [place] law.  If 90 days passes, I can not renew the medication. I recommend you schedule now.      We are watching that spot on your neck and if it continues to decrease, fine, but if not we, may ultrasound that next time.               Maryjane Yuan MD

## 2025-05-07 NOTE — PATIENT INSTRUCTIONS
Stay away from caffeine.    We did discuss considering treating anxiety if you find that it is a frequent issue.     You have been given a controlled substance.  These drugs are addicting and must be used with caution.  Use the drug only when needed and avoid operating heavy machinery or driving when using the medication until you know how you respond.  Dispose of the medication properly when you no longer need it.  I need to see you every 90 days, routine drug testing and Hayes monitoring will be done as required by [place] law.  If 90 days passes, I can not renew the medication. I recommend you schedule now.      We are watching that spot on your neck and if it continues to decrease, fine, but if not we, may ultrasound that next time.

## 2025-06-27 DIAGNOSIS — F90.0 ATTENTION DEFICIT HYPERACTIVITY DISORDER (ADHD), PREDOMINANTLY INATTENTIVE TYPE: ICD-10-CM

## 2025-06-27 RX ORDER — DEXMETHYLPHENIDATE HYDROCHLORIDE 10 MG/1
10 CAPSULE, EXTENDED RELEASE ORAL DAILY
Qty: 30 CAPSULE | Refills: 0 | Status: SHIPPED | OUTPATIENT
Start: 2025-06-27

## 2025-08-05 ENCOUNTER — OFFICE VISIT (OUTPATIENT)
Dept: FAMILY MEDICINE CLINIC | Facility: CLINIC | Age: 25
End: 2025-08-05
Payer: COMMERCIAL

## 2025-08-05 VITALS
DIASTOLIC BLOOD PRESSURE: 70 MMHG | WEIGHT: 216 LBS | OXYGEN SATURATION: 100 % | SYSTOLIC BLOOD PRESSURE: 122 MMHG | TEMPERATURE: 98.2 F | HEIGHT: 71 IN | BODY MASS INDEX: 30.24 KG/M2 | HEART RATE: 75 BPM

## 2025-08-05 DIAGNOSIS — F90.0 ATTENTION DEFICIT HYPERACTIVITY DISORDER (ADHD), PREDOMINANTLY INATTENTIVE TYPE: Primary | ICD-10-CM

## 2025-08-05 DIAGNOSIS — R00.2 PALPITATIONS: ICD-10-CM

## 2025-08-05 PROCEDURE — 99213 OFFICE O/P EST LOW 20 MIN: CPT | Performed by: FAMILY MEDICINE

## 2025-08-05 RX ORDER — DEXMETHYLPHENIDATE HYDROCHLORIDE 10 MG/1
10 CAPSULE, EXTENDED RELEASE ORAL DAILY
Qty: 30 CAPSULE | Refills: 0 | Status: SHIPPED | OUTPATIENT
Start: 2025-08-05

## 2025-08-05 RX ORDER — CIPROFLOXACIN AND DEXAMETHASONE 3; 1 MG/ML; MG/ML
SUSPENSION/ DROPS AURICULAR (OTIC)
COMMUNITY
Start: 2025-06-06

## (undated) DEVICE — TBG SXN CONN/F UNIV 1/4IN 10FT LF STRL

## (undated) DEVICE — LN SMPL CO2 SHTRM SD STREAM W/M LUER

## (undated) DEVICE — CANN O2 ETCO2 FITS ALL CONN CO2 SMPL A/ 7IN DISP LF

## (undated) DEVICE — KT ORCA ORCAPOD DISP STRL

## (undated) DEVICE — TUBING, SUCTION, 1/4" X 10', STRAIGHT: Brand: MEDLINE

## (undated) DEVICE — SENSR O2 OXIMAX FNGR A/ 18IN NONSTR

## (undated) DEVICE — Device: Brand: DEFENDO AIR/WATER/SUCTION AND BIOPSY VALVE

## (undated) DEVICE — THE TORRENT IRRIGATION SCOPE CONNECTOR IS USED WITH THE TORRENT IRRIGATION TUBING TO PROVIDE IRRIGATION FLUIDS SUCH AS STERILE WATER DURING GASTROINTESTINAL ENDOSCOPIC PROCEDURES WHEN USED IN CONJUNCTION WITH AN IRRIGATION PUMP (OR ELECTROSURGICAL UNIT).: Brand: TORRENT

## (undated) DEVICE — ADAPT CLN BIOGUARD AIR/H2O DISP

## (undated) DEVICE — CANN NASL CO2 TRULINK W/O2 A/